# Patient Record
Sex: FEMALE | Race: WHITE | Employment: UNEMPLOYED | URBAN - METROPOLITAN AREA
[De-identification: names, ages, dates, MRNs, and addresses within clinical notes are randomized per-mention and may not be internally consistent; named-entity substitution may affect disease eponyms.]

---

## 2020-10-21 ENCOUNTER — ROUTINE PRENATAL (OUTPATIENT)
Dept: OBGYN CLINIC | Age: 25
End: 2020-10-21
Payer: COMMERCIAL

## 2020-10-21 VITALS
SYSTOLIC BLOOD PRESSURE: 116 MMHG | WEIGHT: 245 LBS | BODY MASS INDEX: 38.45 KG/M2 | HEART RATE: 76 BPM | HEIGHT: 67 IN | TEMPERATURE: 98.1 F | DIASTOLIC BLOOD PRESSURE: 78 MMHG

## 2020-10-21 LAB
GLUCOSE URINE, POC: NEGATIVE
PROTEIN UA: NEGATIVE

## 2020-10-21 PROCEDURE — G8484 FLU IMMUNIZE NO ADMIN: HCPCS | Performed by: OBSTETRICS & GYNECOLOGY

## 2020-10-21 PROCEDURE — 76813 OB US NUCHAL MEAS 1 GEST: CPT | Performed by: OBSTETRICS & GYNECOLOGY

## 2020-10-21 PROCEDURE — 99242 OFF/OP CONSLTJ NEW/EST SF 20: CPT | Performed by: OBSTETRICS & GYNECOLOGY

## 2020-10-21 PROCEDURE — 81002 URINALYSIS NONAUTO W/O SCOPE: CPT | Performed by: OBSTETRICS & GYNECOLOGY

## 2020-10-21 PROCEDURE — G8427 DOCREV CUR MEDS BY ELIG CLIN: HCPCS | Performed by: OBSTETRICS & GYNECOLOGY

## 2020-10-21 PROCEDURE — G8419 CALC BMI OUT NRM PARAM NOF/U: HCPCS | Performed by: OBSTETRICS & GYNECOLOGY

## 2020-10-21 RX ORDER — PNV NO.95/FERROUS FUM/FOLIC AC 28MG-0.8MG
1 TABLET ORAL DAILY
COMMUNITY

## 2020-10-21 SDOH — HEALTH STABILITY: MENTAL HEALTH: HOW OFTEN DO YOU HAVE A DRINK CONTAINING ALCOHOL?: NEVER

## 2020-10-21 NOTE — PROGRESS NOTES
10/21/20    RE:  Keaton Funes   : 1995   AGE: 89 [place] y.o. REFERRING PHYSICIANs:                      MD Lawson Hurt MD    Dear DrsYuri Thank you for referring Keaton Funes a 89 [place] [place] y.o.   who is seen today in our office. REASON FOR CONSULTATION:  · Consult for advice and opinion of pregnant obese patient with history of marijuana abuse in first trimester. Mrs Keaton Funes gave the following history when I saw her today:    OB History    Para Term  AB Living   1 0 0 0 0 0   SAB TAB Ectopic Molar Multiple Live Births   0 0 0 0 0 0      # Outcome Date GA Lbr Ezequiel/2nd Weight Sex Delivery Anes PTL Lv   1 Current              PAST GYNECOLOGICAL  HISTORY:  Negative for abnormal pap smears requiring surgical treatment. Negative for sexually transmitted diseases. PAST MEDICAL HISTORY:  Negative for Hypertension, Diabetes, Thyroid disease , Asthma or Heart disease. PAST SURGICAL HISTORY:  Past Surgical History:   Procedure Laterality Date    LYMPH NODE BIOPSY      TONSILLECTOMY AND ADENOIDECTOMY     Negative for Appendectomy, Cholecystectomy or surgery on the cervix such as LEEP, [facility] or Cryotherapy. ALLERGIES:    No Known Allergies      Current Outpatient Medications:     Pyridoxine HCl (VITAMIN B6 PO), Take 1 tablet by mouth every 8 hours as needed, Disp: , Rfl:     Prenatal Vit-Fe Fumarate-FA (PRENATAL VITAMIN) 27-0.8 MG TABS, Take 1 tablet by mouth daily, Disp: , Rfl:     SOCIAL  HISTORY:   She denies cigarette smoking. She uses marijuana daily. She denied alcohol and other illicit drug abuse.     REVIEW OF SYSTEMS:    CONSTITUTIONAL : No fever, no chills   HEENT :  No headache, no visual changes, no rhinorrhea, no sore throat   CARDIOVASCULAR :  No pain, no palpitations, no edema   RESPIRATORY :  No pain, no shortness of breath   GASTROINTESTINAL : No N/V, no D/C, no abdominal pain   GENITOURINARY :  No dysuria, hematuria and no incontinence   MUSCULOSKELETAL:  No myalgia, No back pain  NEUROLOGICAL :  No numbness, no tingling, no tremors. No history of seizures    FAMILY MEDICAL HISTORY:   Negative for birth defects, chromosomal anomalies and Mental retardation. OB Genetic Screening    Patient's Age 35+ at Date of Delivery No     Thalassemia MCV<80 No     Neural Tube Defect No     Congenital Heart Defect No     Down Syndrome No     Tony-Sachs No     Sickle Cell Disease or Trait No     Hemophilia No     Muscular Dystrophy No     Cystic Fibrosis No     [place] Chorea No     Mental Retardation/Autism No     Was Person Treated for Fragilex? No     Other Inherited Genetic Chromosomal Disorder? No     Maternal Metabolic Disorder No     Patient or [de-identified] Father Had Other Defects? No     Recurrent Pregnancy Loss or Still Birth? No        OB Infection History    High Risk Hepatitis B/Immunized? Yes     Live with Someone with or Exposed to TB? No     Patient or Partner has Hx of Genital Herpes? No     Rash or Viral Illness Since LMP? No     History of STD/GC/Chlamydia/HPV/Syphilis? No        Mrs Lance Hatchet had an uneventful course of pregnancy so far. When seen today in our office she had no complaints. PHYSICAL EXAMINATION:    General Appearance:  Healthy looking, alert, no acute distress. Eyes:     No pallor, no icterus, no photophobia. Ears:     No ear drainage. Nose:     No nasal drainage, no paranasal sinus tenderness. Throat:   Mucosa moist, no oral thrush, no exudate. Neck:     No nuchal rigidity. Back:     No CVA tenderness. Abdomen:    Soft nontender. Extremities:    No pretibial pitting edema, no calf muscle tenderness. Skin:     No rashes, no lesions. BP: 116/78 Weight: 245 lb (111.1 kg) Height: 5' 7\" (170.2 cm) Pulse: 76     Body mass index is 38.37 kg/m².   Urine dipstick:  Glucose : Negative   Albumin:  Negative       An ultrasound evaluation was done in our office today. Please refer to the enclosed copy of the ultrasound report for further information. IMPRESSION:  1. A 13w4d intrauterine pregnancy. 2. Obesity. 3. Marijuana abuse during pregnancy. 4. Anxiety, used BuSpar in the first trimester, stopped. RECOMMENDATIONS/PLAN:  I discussed with the patient the following points:    1. The benefits and limitations of the first trimester hormonal screening test along with a nuchal translucency measurement: This test can detect up to 90% of cases of fetal chromosomal anomalies. This means that 10% of the cases are going to be missed by this test.    2. The fact that the 1st trimester test does not screen for birth defects and neural tube defects. An anatomical survey of the baby by ultrasound will be necessary between 16 to 20 weeks to check for birth defects. 3. Screening for neural tube defects will involve maternal serum alpha-fetoprotein to be done in the 2nd trimester or a targeted ultrasound evaluation of the intracranial anatomy and spine. 4. The second trimester hormonal screening test (quad screen)  can detect up to 80% of fetal chromosomal anomalies. It carries however high false positive rate and requires confirmation with a genetic amniocentesis. 5. The fact that only a genetic amniocentesis can detect chromosomal anomalies. It carries, however, a risk of causing fetal loss. ( the risk of loss is quoted to be between 1:200 to 1:500). 6. She declined the genetic amniocentesis. I discussed the cell free DNA test ( NIPT)  with the patient. I advised her that this a screening test not a diagnostic test.The test screens only for trisomy 21, 18 and 13. She understands that the cell free DNA is  a screening test, it does not replace the diagnostic genetic amniocentesis. She agreed to have the cell free DNA test. It was ordered today.   7. Exposure to different medications such as Buspar in the first trimester of pregnancy increases the likelihood of having a baby with birth defects. No structural anomalies are seen today by ultrasound . Not all birth defects, however, can be seen by ultrasound, and some defects might show on ultrasounds done later in pregnancy. 8. The ill effects of cigarette smoking and substance abuse during pregnancy and its association with an increased risk of intrauterine growth restriction, placental abruption, and pregnancy loss. In addition to the increased risk of  morbidity and mortality there is an increased risk of sudden infant death syndrome in the households where people smoke. I recommend that she stops smoking, and abstains from illicit drug use. 9. Obesity is assosiated with an increased risk of developing gestational diabetes, and disturbance in the growth of her baby, such as Large for dates and small for Dates. He said that gestational diabetes was ruled out with a negative GTT that was done in your office. Result of the test is not available for review. The test should be repeated at 26-28 weeks of gestation. 10. She is to continue to follow with you in your office for ongoing obstetric care. 11. I recommend second trimester fetal anatomical survey at 18 to 20 weeks of gestation. Thank you again, doctor, for allowing us to be of service to your patient. If I can be of further assistance, please do not hesitate to call. Sincerely,        Suzanne Sommers M.D., [facility]    Current encounter billing:  [place] OFFICE CONSULTATION NEW/ESTAB PATIENT 40 MIN [[id number]]  US Fetal Nuchal Translucency [[id number] CPT(R)]    **This report has been created using voice recognition software.  It may contain minor errors     which are inherent in voice recognition technology**

## 2020-10-21 NOTE — LETTER
10/21/20    RE:  Franck Given   : 1995   AGE: 31 y.o. REFERRING PHYSICIANs:                      MD Rodrigo Richardson MD    Dear Leena Thank you for referring Franck Given a 31 y.o.   who is seen today in our office. REASON FOR CONSULTATION:  · Consult for advice and opinion of pregnant obese patient with history of marijuana abuse in first trimester. Mrs Franck Rockwell gave the following history when I saw her today:    OB History    Para Term  AB Living   1 0 0 0 0 0   SAB TAB Ectopic Molar Multiple Live Births   0 0 0 0 0 0      # Outcome Date GA Lbr Ezequiel/2nd Weight Sex Delivery Anes PTL Lv   1 Current              PAST GYNECOLOGICAL  HISTORY:  Negative for abnormal pap smears requiring surgical treatment. Negative for sexually transmitted diseases. PAST MEDICAL HISTORY:  Negative for Hypertension, Diabetes, Thyroid disease , Asthma or Heart disease. PAST SURGICAL HISTORY:  Past Surgical History:   Procedure Laterality Date    LYMPH NODE BIOPSY      TONSILLECTOMY AND ADENOIDECTOMY     Negative for Appendectomy, Cholecystectomy or surgery on the cervix such as LEEP, [facility] or Cryotherapy. ALLERGIES:    No Known Allergies      Current Outpatient Medications:     Pyridoxine HCl (VITAMIN B6 PO), Take 1 tablet by mouth every 8 hours as needed, Disp: , Rfl:     Prenatal Vit-Fe Fumarate-FA (PRENATAL VITAMIN) 27-0.8 MG TABS, Take 1 tablet by mouth daily, Disp: , Rfl:     SOCIAL  HISTORY:   She denies cigarette smoking. She uses marijuana daily. She denied alcohol and other illicit drug abuse.     REVIEW OF SYSTEMS:    CONSTITUTIONAL : No fever, no chills   HEENT :  No headache, no visual changes, no rhinorrhea, no sore throat   CARDIOVASCULAR :  No pain, no palpitations, no edema   RESPIRATORY :  No pain, no shortness of breath GASTROINTESTINAL : No N/V, no D/C, no abdominal pain   GENITOURINARY :  No dysuria, hematuria and no incontinence   MUSCULOSKELETAL:  No myalgia, No back pain  NEUROLOGICAL :  No numbness, no tingling, no tremors. No history of seizures    FAMILY MEDICAL HISTORY:   Negative for birth defects, chromosomal anomalies and Mental retardation. OB Genetic Screening    Patient's Age 35+ at Date of Delivery No     Thalassemia MCV<80 No     Neural Tube Defect No     Congenital Heart Defect No     Down Syndrome No     Tony-Sachs No     Sickle Cell Disease or Trait No     Hemophilia No     Muscular Dystrophy No     Cystic Fibrosis No     [place] Chorea No     Mental Retardation/Autism No     Was Person Treated for Fragilex? No     Other Inherited Genetic Chromosomal Disorder? No     Maternal Metabolic Disorder No     Patient or [de-identified] Father Had Other Defects? No     Recurrent Pregnancy Loss or Still Birth? No        OB Infection History    High Risk Hepatitis B/Immunized? Yes     Live with Someone with or Exposed to TB? No     Patient or Partner has Hx of Genital Herpes? No     Rash or Viral Illness Since LMP? No     History of STD/GC/Chlamydia/HPV/Syphilis? No        Mrs Marifer Cruz had an uneventful course of pregnancy so far. When seen today in our office she had no complaints. PHYSICAL EXAMINATION:    General Appearance:  Healthy looking, alert, no acute distress. Eyes:     No pallor, no icterus, no photophobia. Ears:     No ear drainage. Nose:     No nasal drainage, no paranasal sinus tenderness. Throat:   Mucosa moist, no oral thrush, no exudate. Neck:     No nuchal rigidity. Back:     No CVA tenderness. Abdomen:    Soft nontender. Extremities:    No pretibial pitting edema, no calf muscle tenderness. Skin:     No rashes, no lesions. BP: 116/78 Weight: 245 lb (111.1 kg) Height: 5' 7\" (170.2 cm) Pulse: 76     Body mass index is 38.37 kg/m².   Urine dipstick: Glucose : Negative   Albumin:  Negative       An ultrasound evaluation was done in our office today. Please refer to the enclosed copy of the ultrasound report for further information. IMPRESSION:  1. A 13w4d intrauterine pregnancy. 2. Obesity. 3. Marijuana abuse during pregnancy. 4. Anxiety, used BuSpar in the first trimester, stopped. RECOMMENDATIONS/PLAN:  I discussed with the patient the following points:    1. The benefits and limitations of the first trimester hormonal screening test along with a nuchal translucency measurement: This test can detect up to 90% of cases of fetal chromosomal anomalies. This means that 10% of the cases are going to be missed by this test.    2. The fact that the 1st trimester test does not screen for birth defects and neural tube defects. An anatomical survey of the baby by ultrasound will be necessary between 16 to 20 weeks to check for birth defects. 3. Screening for neural tube defects will involve maternal serum alpha-fetoprotein to be done in the 2nd trimester or a targeted ultrasound evaluation of the intracranial anatomy and spine. 4. The second trimester hormonal screening test (quad screen)  can detect up to 80% of fetal chromosomal anomalies. It carries however high false positive rate and requires confirmation with a genetic amniocentesis. 5. The fact that only a genetic amniocentesis can detect chromosomal anomalies. It carries, however, a risk of causing fetal loss. ( the risk of loss is quoted to be between 1:200 to 1:500). 6. She declined the genetic amniocentesis. I discussed the cell free DNA test ( NIPT)  with the patient. I advised her that this a screening test not a diagnostic test.The test screens only for trisomy 21, 18 and 13. She understands that the cell free DNA is  a screening test, it does not replace the diagnostic genetic amniocentesis. She agreed to have the cell free DNA test. It was ordered today. 7. Exposure to different medications such as Buspar in the first trimester of pregnancy increases the likelihood of having a baby with birth defects. No structural anomalies are seen today by ultrasound . Not all birth defects, however, can be seen by ultrasound, and some defects might show on ultrasounds done later in pregnancy. 8. The ill effects of cigarette smoking and substance abuse during pregnancy and its association with an increased risk of intrauterine growth restriction, placental abruption, and pregnancy loss. In addition to the increased risk of  morbidity and mortality there is an increased risk of sudden infant death syndrome in the households where people smoke. I recommend that she stops smoking, and abstains from illicit drug use. 9. Obesity is assosiated with an increased risk of developing gestational diabetes, and disturbance in the growth of her baby, such as Large for dates and small for Dates. He said that gestational diabetes was ruled out with a negative GTT that was done in your office. Result of the test is not available for review. The test should be repeated at 26-28 weeks of gestation. 10. She is to continue to follow with you in your office for ongoing obstetric care. 11. I recommend second trimester fetal anatomical survey at 18 to 20 weeks of gestation. Thank you again, doctor, for allowing us to be of service to your patient. If I can be of further assistance, please do not hesitate to call. Sincerely,        Jenny West M.D., [address] [facility]    Current encounter billing:  [place] OFFICE CONSULTATION NEW/ESTAB PATIENT 40 MIN [[id number]]  US Fetal Nuchal Translucency [[id number] CPT(R)]    **This report has been created using voice recognition software.  It may contain minor errors     which are inherent in voice recognition technology**

## 2020-11-02 ENCOUNTER — TELEPHONE (OUTPATIENT)
Dept: OBGYN CLINIC | Age: 25
End: 2020-11-02

## 2020-12-02 ENCOUNTER — ROUTINE PRENATAL (OUTPATIENT)
Dept: OBGYN CLINIC | Age: 25
End: 2020-12-02
Payer: COMMERCIAL

## 2020-12-02 VITALS
TEMPERATURE: 97.5 F | DIASTOLIC BLOOD PRESSURE: 75 MMHG | WEIGHT: 244 LBS | BODY MASS INDEX: 38.3 KG/M2 | HEIGHT: 67 IN | SYSTOLIC BLOOD PRESSURE: 115 MMHG | HEART RATE: 111 BPM

## 2020-12-02 PROBLEM — Z3A.19 19 WEEKS GESTATION OF PREGNANCY: Status: ACTIVE | Noted: 2020-12-02

## 2020-12-02 LAB
GLUCOSE URINE, POC: NEGATIVE
PROTEIN UA: NEGATIVE

## 2020-12-02 PROCEDURE — G8419 CALC BMI OUT NRM PARAM NOF/U: HCPCS | Performed by: OBSTETRICS & GYNECOLOGY

## 2020-12-02 PROCEDURE — 99213 OFFICE O/P EST LOW 20 MIN: CPT | Performed by: OBSTETRICS & GYNECOLOGY

## 2020-12-02 PROCEDURE — 81002 URINALYSIS NONAUTO W/O SCOPE: CPT | Performed by: OBSTETRICS & GYNECOLOGY

## 2020-12-02 PROCEDURE — 1036F TOBACCO NON-USER: CPT | Performed by: OBSTETRICS & GYNECOLOGY

## 2020-12-02 PROCEDURE — 76811 OB US DETAILED SNGL FETUS: CPT | Performed by: OBSTETRICS & GYNECOLOGY

## 2020-12-02 PROCEDURE — G8427 DOCREV CUR MEDS BY ELIG CLIN: HCPCS | Performed by: OBSTETRICS & GYNECOLOGY

## 2020-12-02 PROCEDURE — 76817 TRANSVAGINAL US OBSTETRIC: CPT | Performed by: OBSTETRICS & GYNECOLOGY

## 2020-12-02 PROCEDURE — G8484 FLU IMMUNIZE NO ADMIN: HCPCS | Performed by: OBSTETRICS & GYNECOLOGY

## 2020-12-02 NOTE — PATIENT INSTRUCTIONS
call your doctor if you are having problems. It's also a good idea to know your test results and keep a list of the medicines you take. How can you care for yourself at home? Ease sleep problems  · Avoid caffeine in drinks or chocolate late in the day. · Get some exercise every day. · Take a warm shower or bath before bed. · Have a light snack or glass of milk at bedtime. · Do relaxation exercises in bed to calm your mind and body. · Support your legs and back with extra pillows. Try a pillow between your legs if you sleep on your side. · Do not use sleeping pills or alcohol. They could harm your baby. Ease leg cramps  · Do not massage your calf during the cramp. · Sit on a firm bed or chair. Straighten your leg, and bend your foot (flex your ankle) slowly upward, toward your knee. Bend your toes up and down. · Stand on a cool, flat surface. Stretch your toes upward, and take small steps walking on your heels. · Use a heating pad or hot water bottle to help with muscle ache. Prevent leg cramps  · Be sure to get enough calcium. If you are worried that you are not getting enough, talk to your doctor. · Exercise every day, and stretch your legs before bed. · Take a warm bath before bed, and try leg warmers at night. Where can you learn more? Go to https://[employer]Me. org and sign in to your [employer] account. Enter L531 in the [facility] box to learn more about \"Weeks 18 to 22 of Your Pregnancy: Care Instructions. \"     If you do not have an account, please click on the \"Sign Up Now\" link. Current as of: February 11, 2020               Content Version: 12.6  © 5767-2141 [employer]. Care instructions adapted under license by [facility] ([facility]). If you have questions about a medical condition or this instruction, always ask your healthcare professional. [address] any warranty or liability for your use of this information.          Patient

## 2020-12-02 NOTE — PROGRESS NOTES
20     RE:  Marifer Cruz   : 1995   AGE: 31 y.o. REFERRING PHYSICIANs:                      MD Dante Chapman MD Patience    Dear Leena Mrs. Marifer Cruz a 31 y.o.    is seen today on follow up in our office. REASON FOR APPOINTMENT:  · Follow-up on a pregnant obese patient with history of marijuana abuse in first trimester. MEDICATIONS:    Prenatal Vitamins once per day   Vitamin B6 1 tablet every 8 hours as needed. INTERVAL HISTORY:  Mrs Marifer Cruz had an uneventful course of pregnancy since her last visit to our office. When seen today in our office she had no complaints. PHYSICAL EXAMINATION:  General Appearance:  Healthy looking, alert, no acute distress. Eyes:     No pallor, no icterus, no photophobia. Ears:     No ear drainage. Nose:     No nasal drainage, no paranasal sinus tenderness. Throat:   Mucosa moist, no oral thrush, no exudate. Neck:     No nuchal rigidity. Back:     No CVA tenderness. Abdomen:    Soft nontender. Extremities:    No pretibial pitting edema, no calf muscle tenderness. Skin:     No rashes, no lesions. BP: 115/75 Weight: 244 lb (110.7 kg) Height: 5' 7\" (170.2 cm) Pulse: 111     Body mass index is 38.22 kg/m². Urine dipstick:  Glucose : Negative   Albumin:  Negative       An ultrasound evaluation was done in our office today. Please refer to the enclosed copy of the ultrasound report for further information. Chart and Lab Work Review:    I reviewed with the patient the result of the:  · Cell free DNA test collected on 10/21/2020 that showed low probability of having baby with trisomy 24, 25 or 13. IMPRESSION:  1. A  19w4d  intrauterine gestation. 2. Obesity. 3. Marijuana abuse during pregnancy. 4. Anxiety, used BuSpar in the first trimester, stopped. RECOMMENDATIONS/PLAN:  I discussed with the patient the following points:      1.  The benefits and limitations of ultrasound in prenatal diagnosis. Some defects might not always be seen by ultrasound. Estimated incidence of these defects in the general population is 2- 4%. 2. No structural  anomalies are noted. Only genetic amniocentesis can rule out fetal chromosome anomalies. Normal ultrasound does not. 3. The size of her baby is appropriate for gestational age. 4. Based on her age and the absence of chromosomal markers by ultrasound today, the risk of chromosomal anomalies is small and does not indicate a need for an amniocentesis, a procedure that might cause pregnancy loss ( the risk of loss is quoted to be between 1:200 to 1:500). 5. An amniocentesis is indicated if fetal structural defects are seen or if the first Trimester,  second trimester hormonal screening test (quad screen) , or if the cell free DNA test NIPT: non-invasive prenatal test) showed an increase risk for Chromosomal anomalies. She was reassured by the result of the cell free DNA test. I explained to her that this a screening test not a diagnostic test. It does not replace the diagnostic genetic amniocentesis. It screens only for trisomy 21, 18 and 13.  6. Exposure to different medications such as Buspar in the first trimester of pregnancy increases the likelihood of having a baby with birth defects. No structural anomalies are seen today by ultrasound . Not all birth defects, however, can be seen by ultrasound, and some defects might show on ultrasounds done later in pregnancy. 7. The ill effects of cigarette smoking and substance abuse during pregnancy and its association with an increased risk of intrauterine growth restriction, placental abruption, and pregnancy loss. In addition to the increased risk of  morbidity and mortality there is an increased risk of sudden infant death syndrome in the households where people smoke.  I recommend that she stops smoking, and abstains from illicit drug use.  8. Obesity is assosiated with an increased risk of developing gestational diabetes, and disturbance in the growth of her baby, such as Large for dates and small for Dates. He said that gestational diabetes was ruled out with a negative GTT that was done in your office. Result of the test is not available for review. The test should be repeated at 26-28 weeks of gestation. 9. The cervical length measurement today is reassuring. It is within normal limits. No funneling or shortening were noted. 10. She is to continue to follow with you in your office for ongoing obstetric care. 11. I recommend a follow up ultrasound evaluation in 10 weeks, to check on fetal wellbeing anatomy and growth. Thank you again, doctor, for allowing us to be of service to your patient. If I can be of further assistance, please do not hesitate to call. Sincerely,        Abhi Saleh M.D., [address] [facility]    Current encounter billing:  [place] OFFICE OUTPATIENT VISIT 15 MINUTES [[id number]]  US OB Detail Fetal Anatomy Single or 1st [[id number] Custom]  US OB Transvaginal [[id number] Custom]    **This report has been created using voice recognition software.  It may contain minor errors     which are inherent in voice recognition technology**

## 2020-12-02 NOTE — LETTER
20     RE:  Sree Blake   : 1995   AGE: 31 y.o. REFERRING PHYSICIANs:                      MD Marc Kapoor MD Labor    Dear Shwetha. Mrs. Sree Blake a 31 y.o.    is seen today on follow up in our office. REASON FOR APPOINTMENT:  · Follow-up on a pregnant obese patient with history of marijuana abuse in first trimester. MEDICATIONS:    Prenatal Vitamins once per day   Vitamin B6 1 tablet every 8 hours as needed. INTERVAL HISTORY:  Mrs Sree Blake had an uneventful course of pregnancy since her last visit to our office. When seen today in our office she had no complaints. PHYSICAL EXAMINATION:  General Appearance:  Healthy looking, alert, no acute distress. Eyes:     No pallor, no icterus, no photophobia. Ears:     No ear drainage. Nose:     No nasal drainage, no paranasal sinus tenderness. Throat:   Mucosa moist, no oral thrush, no exudate. Neck:     No nuchal rigidity. Back:     No CVA tenderness. Abdomen:    Soft nontender. Extremities:    No pretibial pitting edema, no calf muscle tenderness. Skin:     No rashes, no lesions. BP: 115/75 Weight: 244 lb (110.7 kg) Height: 5' 7\" (170.2 cm) Pulse: 111     Body mass index is 38.22 kg/m². Urine dipstick:  Glucose : Negative   Albumin:  Negative       An ultrasound evaluation was done in our office today. Please refer to the enclosed copy of the ultrasound report for further information. Chart and Lab Work Review:    I reviewed with the patient the result of the:  · Cell free DNA test collected on 10/21/2020 that showed low probability of having baby with trisomy 24, 25 or 13. IMPRESSION:  1. A  19w4d  intrauterine gestation. 2. Obesity. 3. Marijuana abuse during pregnancy. 4. Anxiety, used BuSpar in the first trimester, stopped.     RECOMMENDATIONS/PLAN:  I discussed with the patient the following points: 1. The benefits and limitations of ultrasound in prenatal diagnosis. Some defects might not always be seen by ultrasound. Estimated incidence of these defects in the general population is 2- 4%. 2. No structural  anomalies are noted. Only genetic amniocentesis can rule out fetal chromosome anomalies. Normal ultrasound does not. 3. The size of her baby is appropriate for gestational age. 4. Based on her age and the absence of chromosomal markers by ultrasound today, the risk of chromosomal anomalies is small and does not indicate a need for an amniocentesis, a procedure that might cause pregnancy loss ( the risk of loss is quoted to be between 1:200 to 1:500). 5. An amniocentesis is indicated if fetal structural defects are seen or if the first Trimester,  second trimester hormonal screening test (quad screen) , or if the cell free DNA test NIPT: non-invasive prenatal test) showed an increase risk for Chromosomal anomalies. She was reassured by the result of the cell free DNA test. I explained to her that this a screening test not a diagnostic test. It does not replace the diagnostic genetic amniocentesis. It screens only for trisomy 21, 18 and 13.  6. Exposure to different medications such as Buspar in the first trimester of pregnancy increases the likelihood of having a baby with birth defects. No structural anomalies are seen today by ultrasound . Not all birth defects, however, can be seen by ultrasound, and some defects might show on ultrasounds done later in pregnancy. 7. The ill effects of cigarette smoking and substance abuse during pregnancy and its association with an increased risk of intrauterine growth restriction, placental abruption, and pregnancy loss. In addition to the increased risk of  morbidity and mortality there is an increased risk of sudden infant death syndrome in the households where people smoke. I recommend that she stops smoking, and abstains from illicit drug use. 8. Obesity is assosiated with an increased risk of developing gestational diabetes, and disturbance in the growth of her baby, such as Large for dates and small for Dates. He said that gestational diabetes was ruled out with a negative GTT that was done in your office. Result of the test is not available for review. The test should be repeated at 26-28 weeks of gestation. 9. The cervical length measurement today is reassuring. It is within normal limits. No funneling or shortening were noted. 10. She is to continue to follow with you in your office for ongoing obstetric care. 11. I recommend a follow up ultrasound evaluation in 10 weeks, to check on fetal wellbeing anatomy and growth. Thank you again, doctor, for allowing us to be of service to your patient. If I can be of further assistance, please do not hesitate to call. Sincerely,        Jeanine Amor M.D., [address] [facility]    Current encounter billing:  [place] OFFICE OUTPATIENT VISIT 15 MINUTES [[id number]]  US OB Detail Fetal Anatomy Single or 1st [[id number] Custom]  US OB Transvaginal [[id number] Custom]    **This report has been created using voice recognition software.  It may contain minor errors     which are inherent in voice recognition technology**

## 2021-02-10 ENCOUNTER — ANCILLARY PROCEDURE (OUTPATIENT)
Dept: OBGYN CLINIC | Age: 26
End: 2021-02-10
Payer: COMMERCIAL

## 2021-02-10 ENCOUNTER — ROUTINE PRENATAL (OUTPATIENT)
Dept: OBGYN CLINIC | Age: 26
End: 2021-02-10
Payer: COMMERCIAL

## 2021-02-10 VITALS
DIASTOLIC BLOOD PRESSURE: 75 MMHG | HEIGHT: 67 IN | WEIGHT: 255 LBS | HEART RATE: 98 BPM | BODY MASS INDEX: 40.02 KG/M2 | SYSTOLIC BLOOD PRESSURE: 112 MMHG | TEMPERATURE: 98 F

## 2021-02-10 DIAGNOSIS — O99.333 TOBACCO SMOKING COMPLICATING PREGNANCY, THIRD TRIMESTER: ICD-10-CM

## 2021-02-10 DIAGNOSIS — O99.213 OBESITY AFFECTING PREGNANCY IN THIRD TRIMESTER: Primary | ICD-10-CM

## 2021-02-10 DIAGNOSIS — O99.323 DRUG DEPENDENCE AFFECTING PREGNANCY IN THIRD TRIMESTER: ICD-10-CM

## 2021-02-10 DIAGNOSIS — F12.20 CANNABIS DEPENDENCE (HCC): ICD-10-CM

## 2021-02-10 DIAGNOSIS — Z3A.29 29 WEEKS GESTATION OF PREGNANCY: ICD-10-CM

## 2021-02-10 LAB
GLUCOSE URINE, POC: NEGATIVE
PROTEIN UA: NEGATIVE

## 2021-02-10 PROCEDURE — 1036F TOBACCO NON-USER: CPT | Performed by: OBSTETRICS & GYNECOLOGY

## 2021-02-10 PROCEDURE — 99212 OFFICE O/P EST SF 10 MIN: CPT | Performed by: OBSTETRICS & GYNECOLOGY

## 2021-02-10 PROCEDURE — 76819 FETAL BIOPHYS PROFIL W/O NST: CPT | Performed by: OBSTETRICS & GYNECOLOGY

## 2021-02-10 PROCEDURE — G8419 CALC BMI OUT NRM PARAM NOF/U: HCPCS | Performed by: OBSTETRICS & GYNECOLOGY

## 2021-02-10 PROCEDURE — G8427 DOCREV CUR MEDS BY ELIG CLIN: HCPCS | Performed by: OBSTETRICS & GYNECOLOGY

## 2021-02-10 PROCEDURE — 99213 OFFICE O/P EST LOW 20 MIN: CPT | Performed by: OBSTETRICS & GYNECOLOGY

## 2021-02-10 PROCEDURE — 81002 URINALYSIS NONAUTO W/O SCOPE: CPT | Performed by: OBSTETRICS & GYNECOLOGY

## 2021-02-10 PROCEDURE — G8484 FLU IMMUNIZE NO ADMIN: HCPCS | Performed by: OBSTETRICS & GYNECOLOGY

## 2021-02-10 PROCEDURE — 76805 OB US >/= 14 WKS SNGL FETUS: CPT | Performed by: OBSTETRICS & GYNECOLOGY

## 2021-02-10 NOTE — LETTER
2/10/21     RE:  Irma Davidson   : 1995   AGE: 31 y.o. REFERRING PHYSICIAN:                                   REFERRING PHYSICIANs:                      MD Nena Urbano M.D., MD  Dear Drs. Mrs. Irma Davidson a 31 y.o.    is seen today on follow up in our office. REASON FOR APPOINTMENT:  · Follow-up on a pregnant obese patient with history of marijuana abuse. MEDICATIONS:    Prenatal Vitamins once per day     INTERVAL HISTORY:  Mrs Irma Davidson had an uneventful course of pregnancy since her last visit to our office. When seen today in our office she had no complaints. INTERVAL HISTORY:  Mrs Irma Davidson had an uneventful course of pregnancy since her last visit to our office. She said that she is transferring her prenatal care to office of . Last used marijuana was mid 2021. When seen today in our office she had no complaints. PHYSICAL EXAMINATION:  General Appearance:  Healthy looking, alert, no acute distress. Eyes:     No pallor, no icterus, no photophobia. Ears:     No ear drainage. Nose:     No nasal drainage, no paranasal sinus tenderness. Throat:   Mucosa moist, no oral thrush, no exudate. Neck:     No nuchal rigidity. Back:     No CVA tenderness. Abdomen:    Soft nontender. Extremities:    No pretibial pitting edema, no calf muscle tenderness. Skin:     No rashes, no lesions. BP: 112/75 Weight: 255 lb (115.7 kg) Height: 5' 7\" (170.2 cm) Pulse: 98     Body mass index is 39.94 kg/m². Urine dipstick:  Glucose : Negative   Albumin:  Negative       An ultrasound evaluation was done in our office today. Please refer to the enclosed copy of the ultrasound report for further information. IMPRESSION:  1. A  29w4d  intrauterine gestation. 2. Obesity. 3. Marijuana abuse during pregnancy. 4. Anxiety, used BuSpar in the first trimester, stopped. 5. Recent transfer of prenatal care to office of Dr. Marisela Murrell. RECOMMENDATIONS/PLAN:  I discussed with the patient the following points:    1. The benefits and limitations of ultrasound in prenatal diagnosis. Some defects might not always be seen by ultrasound. Estimated incidence of these defects in the general population is 2- 4%. 2. No structural  anomalies are noted. Only genetic amniocentesis can rule out fetal chromosome anomalies. Normal ultrasound does not. 3. The size of her baby is appropriate for gestational age. 4. Exposure to different medications such as Buspar in the first trimester of pregnancy increases the likelihood of having a baby with birth defects. No structural anomalies are seen today by ultrasound . Not all birth defects, however, can be seen by ultrasound, and some defects might show on ultrasounds done later in pregnancy. 5. The ill effects of cigarette smoking and substance abuse during pregnancy and its association with an increased risk of intrauterine growth restriction, placental abruption, and pregnancy loss. In addition to the increased risk of  morbidity and mortality there is an increased risk of sudden infant death syndrome in the households where people smoke. I recommend that she stops smoking, and abstains from illicit drug use. 6. Obesity is assosiated with an increased risk of developing gestational diabetes, and disturbance in the growth of her baby, such as Large for dates and small for Dates. She said that she is scheduled to have an appointment at office of [address] [place] [address] on 2021. She was encouraged to keep the appointment. 7. Fetal well-being was confirmed today.   The amount of fluid around baby is normal.  The Biophysical profile score of 8/8 is reassuring, and the umbilical artery Doppler studies are normal. 8. She should monitor fetal well-being at home by counting movements after dinner. Her baby should  move 10 times in 2 hours; otherwise, she should call your office immediately. She is also to call, if she develops any headaches, blurred vision, abdominal pain, bleeding, or spotting, which are signs of preeclampsia. 9. She is to continue to follow with you in your office for ongoing obstetric care. 10. I recommend a follow up ultrasound evaluation in 4 weeks, to check on fetal wellbeing anatomy and growth. Thank you again, doctor, for allowing us to be of service to your patient. If I can be of further assistance, please do not hesitate to call. Sincerely,        Glenice Cushing, M.D., [address] [facility]    Time spent on the encounter was over 15 minutes including counseling  coordination of care and direct face-to-face contact with the patient. Current encounter billing:  [place] OFFICE OUTPATIENT VISIT LOW MDM 20-30 MINUTES [[id number]]  US OB 14 Plus Weeks Single or First Gestation [[id number] Custom]  US Fetal Biophysical Profile WO Non Stress Testing [31230 Custom]    **This report has been created using voice recognition software.  It may contain minor errors     which are inherent in voice recognition technology**

## 2021-02-10 NOTE — PATIENT INSTRUCTIONS
Please arrive for your scheduled appointment at least 15 minutes early with your actual insurance card+ a photo ID. Also if you need any refills ordered or have questions, it may take up 48 hours to reply. Please allow ample time for your refills. Call me when you use last refill. Thank you for your cooperation. Any questions contact [facility] at [phone]. If you are experiencing an emergency and need immediate help, call 911 or go to go emergency room or labor and delivery. if you are sick, not feeling well or have an infectious process going on please reschedule your appointment by calling [phone]. Also if any family members are not feeling well, please do not bring them to your appointment. We appreciate your cooperation. We are doing this in order to protect our pregnant mothers+ their babies. Do kick counts after dinner. Call your primary obstetrician if less than 10 kicks in 2 hours after dinner. Call your primary obstetrician with bleeding, leaking of fluid, abdominal tenderness, headache, blurry vision, epigastric pain and increased urinary frequency. Patient Education        Weeks 26 to 30 of Your Pregnancy: Care Instructions  Your Care Instructions     You are now in your last trimester of pregnancy. Your baby is growing rapidly. And you'll probably feel your baby moving around more often. Your doctor may ask you to count your baby's kicks. Your back may ache as your body gets used to your baby's size and length. If you haven't already had the Tdap shot during this pregnancy, talk to your doctor about getting it. It will help protect your  against pertussis infection. During this time, it's important to take care of yourself and pay attention to what your body needs. If you feel sexual, explore ways to be close with your partner that match your comfort and desire. Use the tips provided in this care sheet to find ways to be sexual in your own way.   Follow-up care is a key part of your treatment and safety. Be sure to make and go to all appointments, and call your doctor if you are having problems. It's also a good idea to know your test results and keep a list of the medicines you take. How can you care for yourself at home? Take it easy at work  · Take frequent breaks. If possible, stop working when you are tired, and rest during your lunch hour. · Take bathroom breaks every 2 hours. · Change positions often. If you sit for long periods, stand up and walk around. · When you stand for a long time, keep one foot on a low stool with your knee bent. After standing a lot, sit with your feet up. · Avoid fumes, chemicals, and tobacco smoke. Be sexual in your own way  · Having sex during pregnancy is okay, unless your doctor tells you not to. · You may be very interested in sex, or you may have no interest at all. · Your growing belly can make it hard to find a good position during intercourse. [place] and explore. · You may get cramps in your uterus when your partner touches your breasts. · A back rub may relieve the backache or cramps that sometimes follow orgasm. Learn about  labor  · Watch for signs of  labor. You may be going into labor if:  ? You have menstrual-like cramps, with or without nausea. ? You have about 6 or more contractions in 1 hour, even after you have had a glass of water and are resting. ? You have a low, dull backache that does not go away when you change your position. ? You have pain or pressure in your pelvis that comes and goes in a pattern. ? You have intestinal cramping or flu-like symptoms, with or without diarrhea.  ? You notice an increase or change in your vaginal discharge. Discharge may be heavy, mucus-like, watery, or streaked with blood. ? Your water breaks. · If you think you have  labor:  ? Drink 2 or 3 glasses of water or juice. Not drinking enough fluids can cause contractions.   ? Stop what you are doing, and empty your bladder. Then lie down on your left side for at least 1 hour. ? While lying on your side, find your breast bone. Put your fingers in the soft spot just below it. Move your fingers down toward your belly button to find the top of your uterus. Check to see if it is tight. ? Contractions can be weak or strong. Record your contractions for an hour. Time a contraction from the start of one contraction to the start of the next one.  ? Single or several strong contractions without a pattern are called Jose Angel-Smith contractions. They are practice contractions but not the start of labor. They often stop if you change what you are doing. ? Call your doctor if you have regular contractions. Where can you learn more? Go to https://Media LanternpeHavgul [employer] Energyeb.[employer]. org and sign in to your Siasto account. Enter J844 in the uTrail me box to learn more about \"Weeks 26 to 30 of Your Pregnancy: Care Instructions. \"     If you do not have an account, please click on the \"Sign Up Now\" link. Current as of: February 11, 2020               Content Version: 12.6  © 3503-0953 [employer]. Care instructions adapted under license by [facility] ([facility]). If you have questions about a medical condition or this instruction, always ask your healthcare professional. [place] [address] any warranty or liability for your use of this information. Patient Education        Learning About When to Call Your Doctor During Pregnancy (After 20 Weeks)  Your Care Instructions  It's common to have concerns about what might be a problem during pregnancy. Although most pregnant women don't have any serious problems, it's important to know when to call your doctor if you have certain symptoms or signs of labor. These are general suggestions. Your doctor may give you some more information about when to call. When to call your doctor (after 20 weeks)  Call 911 anytime you think you may need emergency care.  For example, call if:  · You have severe vaginal bleeding. · You have sudden, severe pain in your belly. · You passed out (lost consciousness). · You have a seizure. · You see or feel the umbilical cord. · You think you are about to deliver your baby and can't make it safely to the hospital.  Call your doctor now or seek immediate medical care if:  · You have vaginal bleeding. · You have belly pain. · You have a fever. · You have symptoms of preeclampsia, such as:  ? Sudden swelling of your face, hands, or feet. ? New vision problems (such as dimness, blurring, or seeing spots). ? A severe headache. · You have a sudden release of fluid from your vagina. (You think your water broke.)  · You think that you may be in labor. This means that you've had at least 6 contractions in an hour. · You notice that your baby has stopped moving or is moving much less than normal.  · You have symptoms of a urinary tract infection. These may include:  ? Pain or burning when you urinate. ? A frequent need to urinate without being able to pass much urine. ? Pain in the flank, which is just below the rib cage and above the waist on either side of the back. ? Blood in your urine. Watch closely for changes in your health, and be sure to contact your doctor if:  · You have vaginal discharge that smells bad. · You have skin changes, such as:  ? A rash. ? Itching. ? Yellow color to your skin. · You have other concerns about your pregnancy. If you have labor signs at 37 weeks or more  If you have signs of labor at 37 weeks or more, your doctor may tell you to call when your labor becomes more active. Symptoms of active labor include:  · Contractions that are regular. · Contractions that are less than 5 minutes apart. · Contractions that are hard to talk through. Follow-up care is a key part of your treatment and safety. Be sure to make and go to all appointments, and call your doctor if you are having problems.  It's also a good idea to know your test results and keep a list of the medicines you take. Where can you learn more? Go to https://chpepiceweb.[employer] org and sign in to your Lala account. Enter  in the [facility] box to learn more about \"Learning About When to Call Your Doctor During Pregnancy (After 20 Weeks). \"     If you do not have an account, please click on the \"Sign Up Now\" link. Current as of: February 11, 2020               Content Version: 12.6  © 6448-5098 [employer]. Care instructions adapted under license by [facility] ([facility]). If you have questions about a medical condition or this instruction, always ask your healthcare professional. [address] any warranty or liability for your use of this information. Patient Education        Counting Your Baby's Kicks: Care Instructions  Your Care Instructions     Counting your baby's kicks is one way your doctor can tell that your baby is healthy. Most women--especially in a first pregnancy--feel their baby move for the first time between 16 and 22 weeks. The movement may feel like flutters rather than kicks. Your baby may move more at certain times of the day. When you are active, you may notice less kicking than when you are resting. At your prenatal visits, your doctor will ask whether the baby is active. In your last trimester, your doctor may ask you to count the number of times you feel your baby move. Follow-up care is a key part of your treatment and safety. Be sure to make and go to all appointments, and call your doctor if you are having problems. It's also a good idea to know your test results and keep a list of the medicines you take. How do you count fetal kicks? · A common method of checking your baby's movement is to count the number of kicks or moves you feel in 1 hour.  Ten movements (such as kicks, flutters, or rolls) in 1 hour are normal. Some doctors suggest that you count in the morning until you get to 10 movements. Then you can quit for that day and start again the next day. · Pick your baby's most active time of day to count. This may be any time from morning to evening. · If you do not feel 10 movements in an hour, your baby may be sleeping. Wait for the next hour and count again. When should you call for help? Call your doctor now or seek immediate medical care if:    · You noticed that your baby has stopped moving or is moving much less than normal.   Watch closely for changes in your health, and be sure to contact your doctor if you have any problems. Where can you learn more? Go to https://[employer]. org and sign in to your Capevo account. Enter B678 in the YoQueVos box to learn more about \"Counting Your Baby's Kicks: Care Instructions. \"     If you do not have an account, please click on the \"Sign Up Now\" link. Current as of: February 11, 2020               Content Version: 12.6  © 2006-2020 [employer]. Care instructions adapted under license by [facility] ([facility]). If you have questions about a medical condition or this instruction, always ask your healthcare professional. [address] any warranty or liability for your use of this information.

## 2021-02-10 NOTE — PROGRESS NOTES
BuSpar in the first trimester, stopped. 5. Recent transfer of prenatal care to office of Dr. Raj Mcmillan. RECOMMENDATIONS/PLAN:  I discussed with the patient the following points:    1. The benefits and limitations of ultrasound in prenatal diagnosis. Some defects might not always be seen by ultrasound. Estimated incidence of these defects in the general population is 2- 4%. 2. No structural  anomalies are noted. Only genetic amniocentesis can rule out fetal chromosome anomalies. Normal ultrasound does not. 3. The size of her baby is appropriate for gestational age. 4. Exposure to different medications such as Buspar in the first trimester of pregnancy increases the likelihood of having a baby with birth defects. No structural anomalies are seen today by ultrasound . Not all birth defects, however, can be seen by ultrasound, and some defects might show on ultrasounds done later in pregnancy. 5. The ill effects of cigarette smoking and substance abuse during pregnancy and its association with an increased risk of intrauterine growth restriction, placental abruption, and pregnancy loss. In addition to the increased risk of  morbidity and mortality there is an increased risk of sudden infant death syndrome in the households where people smoke. I recommend that she stops smoking, and abstains from illicit drug use. 6. Obesity is assosiated with an increased risk of developing gestational diabetes, and disturbance in the growth of her baby, such as Large for dates and small for Dates. She said that she is scheduled to have an appointment at office of [address] [place] [address] on 2021. She was encouraged to keep the appointment. 7. Fetal well-being was confirmed today. The amount of fluid around baby is normal.  The Biophysical profile score of 8/8 is reassuring, and the umbilical artery Doppler studies are normal.  8. She should monitor fetal well-being at home by counting movements after dinner.   Her baby should  move 10 times in 2 hours; otherwise, she should call your office immediately. She is also to call, if she develops any headaches, blurred vision, abdominal pain, bleeding, or spotting, which are signs of preeclampsia. 9. She is to continue to follow with you in your office for ongoing obstetric care. 10. I recommend a follow up ultrasound evaluation in 4 weeks, to check on fetal wellbeing anatomy and growth. Thank you again, doctor, for allowing us to be of service to your patient. If I can be of further assistance, please do not hesitate to call. Sincerely,        Aravind Wan M.D., [facility]    Time spent on the encounter was over 15 minutes including counseling  coordination of care and direct face-to-face contact with the patient. Current encounter billing:  [place] OFFICE OUTPATIENT VISIT LOW MDM 20-30 MINUTES [[id number]]  US OB 14 Plus Weeks Single or First Gestation [64489 Custom]  US Fetal Biophysical Profile WO Non Stress Testing [80486 Custom]    **This report has been created using voice recognition software.  It may contain minor errors     which are inherent in voice recognition technology**

## 2021-02-18 DIAGNOSIS — Z34.93 PRENATAL CARE IN THIRD TRIMESTER: ICD-10-CM

## 2021-02-18 PROBLEM — Z3A.19 19 WEEKS GESTATION OF PREGNANCY: Status: RESOLVED | Noted: 2020-12-02 | Resolved: 2021-02-18

## 2021-02-18 LAB
GLUCOSE TOLERANCE TEST 1 HOUR: 160 MG/DL
GLUCOSE TOLERANCE TEST 2 HOUR: 129 MG/DL
GLUCOSE TOLERANCE TEST FASTING: 89 MG/DL
HCT VFR BLD CALC: 39.1 % (ref 34–48)
HEMOGLOBIN: 12.7 G/DL (ref 11.5–15.5)
MCH RBC QN AUTO: 31.6 PG (ref 26–35)
MCHC RBC AUTO-ENTMCNC: 32.5 % (ref 32–34.5)
MCV RBC AUTO: 97.3 FL (ref 80–99.9)
PDW BLD-RTO: 13.1 FL (ref 11.5–15)
PLATELET # BLD: 194 E9/L (ref 130–450)
PMV BLD AUTO: 10.9 FL (ref 7–12)
RBC # BLD: 4.02 E12/L (ref 3.5–5.5)
WBC # BLD: 11 E9/L (ref 4.5–11.5)

## 2021-02-19 LAB
ABO/RH: NORMAL
ANTIBODY SCREEN: NORMAL

## 2021-03-17 DIAGNOSIS — Z34.93 PRENATAL CARE IN THIRD TRIMESTER: ICD-10-CM

## 2021-03-17 LAB
HCT VFR BLD CALC: 40 % (ref 34–48)
HEMOGLOBIN: 13.2 G/DL (ref 11.5–15.5)
MCH RBC QN AUTO: 31.6 PG (ref 26–35)
MCHC RBC AUTO-ENTMCNC: 33 % (ref 32–34.5)
MCV RBC AUTO: 95.7 FL (ref 80–99.9)
PDW BLD-RTO: 13.3 FL (ref 11.5–15)
PLATELET # BLD: 211 E9/L (ref 130–450)
PMV BLD AUTO: 10.9 FL (ref 7–12)
RBC # BLD: 4.18 E12/L (ref 3.5–5.5)
WBC # BLD: 14.1 E9/L (ref 4.5–11.5)

## 2021-03-21 LAB — GROUP B STREP CULTURE: NORMAL

## 2021-03-22 LAB
C TRACH DNA GENITAL QL NAA+PROBE: NEGATIVE
N. GONORRHOEAE DNA: NEGATIVE
SOURCE: NORMAL

## 2021-04-23 PROBLEM — Z34.02 PRIMIGRAVIDA, SECOND TRIMESTER: Status: ACTIVE | Noted: 2021-04-23

## 2021-04-23 PROBLEM — F41.9 ANXIETY DURING PREGNANCY: Status: ACTIVE | Noted: 2021-04-23

## 2021-04-23 PROBLEM — O99.213 OBESITY AFFECTING PREGNANCY IN THIRD TRIMESTER: Status: ACTIVE | Noted: 2021-04-23

## 2021-04-23 PROBLEM — R87.810 ASCUS WITH POSITIVE HIGH RISK HPV CERVICAL: Status: ACTIVE | Noted: 2021-04-23

## 2021-04-23 PROBLEM — R87.610 ASCUS WITH POSITIVE HIGH RISK HPV CERVICAL: Status: ACTIVE | Noted: 2021-04-23

## 2021-04-23 PROBLEM — O99.340 ANXIETY DURING PREGNANCY: Status: ACTIVE | Noted: 2021-04-23

## 2021-04-23 PROBLEM — F12.91 HISTORY OF MARIJUANA USE: Status: ACTIVE | Noted: 2021-04-23

## 2021-04-24 ENCOUNTER — APPOINTMENT (OUTPATIENT)
Dept: LABOR AND DELIVERY | Age: 26
DRG: 560 | End: 2021-04-24
Payer: COMMERCIAL

## 2021-04-24 ENCOUNTER — HOSPITAL ENCOUNTER (INPATIENT)
Age: 26
LOS: 3 days | Discharge: HOME | DRG: 560 | End: 2021-04-27
Attending: OBSTETRICS & GYNECOLOGY | Admitting: OBSTETRICS & GYNECOLOGY
Payer: COMMERCIAL

## 2021-04-24 PROBLEM — Z3A.40 40 WEEKS GESTATION OF PREGNANCY: Status: ACTIVE | Noted: 2021-04-24

## 2021-04-24 LAB
AMPHETAMINE SCREEN, URINE: NOT DETECTED
BARBITURATE SCREEN URINE: NOT DETECTED
BENZODIAZEPINE SCREEN, URINE: NOT DETECTED
CANNABINOID SCREEN URINE: NOT DETECTED
COCAINE METABOLITE SCREEN URINE: NOT DETECTED
FENTANYL SCREEN, URINE: NOT DETECTED
HCT VFR BLD CALC: 37.5 % (ref 34–48)
HEMOGLOBIN: 12.6 G/DL (ref 11.5–15.5)
Lab: NORMAL
MCH RBC QN AUTO: 31.9 PG (ref 26–35)
MCHC RBC AUTO-ENTMCNC: 33.6 % (ref 32–34.5)
MCV RBC AUTO: 94.9 FL (ref 80–99.9)
METHADONE SCREEN, URINE: NOT DETECTED
OPIATE SCREEN URINE: NOT DETECTED
OXYCODONE URINE: NOT DETECTED
PDW BLD-RTO: 13.6 FL (ref 11.5–15)
PHENCYCLIDINE SCREEN URINE: NOT DETECTED
PLATELET # BLD: 192 E9/L (ref 130–450)
PMV BLD AUTO: 11.4 FL (ref 7–12)
RBC # BLD: 3.95 E12/L (ref 3.5–5.5)
WBC # BLD: 12.1 E9/L (ref 4.5–11.5)

## 2021-04-24 PROCEDURE — 85027 COMPLETE CBC AUTOMATED: CPT

## 2021-04-24 PROCEDURE — 2580000003 HC RX 258: Performed by: OBSTETRICS & GYNECOLOGY

## 2021-04-24 PROCEDURE — 6370000000 HC RX 637 (ALT 250 FOR IP): Performed by: OBSTETRICS & GYNECOLOGY

## 2021-04-24 PROCEDURE — 86901 BLOOD TYPING SEROLOGIC RH(D): CPT

## 2021-04-24 PROCEDURE — 86900 BLOOD TYPING SEROLOGIC ABO: CPT

## 2021-04-24 PROCEDURE — 86850 RBC ANTIBODY SCREEN: CPT

## 2021-04-24 PROCEDURE — 1220000001 HC SEMI PRIVATE L&D R&B

## 2021-04-24 PROCEDURE — 3E0P7VZ INTRODUCTION OF HORMONE INTO FEMALE REPRODUCTIVE, VIA NATURAL OR ARTIFICIAL OPENING: ICD-10-PCS | Performed by: OBSTETRICS & GYNECOLOGY

## 2021-04-24 PROCEDURE — 80307 DRUG TEST PRSMV CHEM ANLYZR: CPT

## 2021-04-24 PROCEDURE — 36415 COLL VENOUS BLD VENIPUNCTURE: CPT

## 2021-04-24 RX ORDER — ONDANSETRON 2 MG/ML
4 INJECTION INTRAMUSCULAR; INTRAVENOUS EVERY 6 HOURS PRN
Status: DISCONTINUED | OUTPATIENT
Start: 2021-04-24 | End: 2021-04-26

## 2021-04-24 RX ORDER — SODIUM CHLORIDE, SODIUM LACTATE, POTASSIUM CHLORIDE, CALCIUM CHLORIDE 600; 310; 30; 20 MG/100ML; MG/100ML; MG/100ML; MG/100ML
INJECTION, SOLUTION INTRAVENOUS CONTINUOUS
Status: DISCONTINUED | OUTPATIENT
Start: 2021-04-24 | End: 2021-04-27 | Stop reason: HOSPADM

## 2021-04-24 RX ADMIN — SODIUM CHLORIDE, POTASSIUM CHLORIDE, SODIUM LACTATE AND CALCIUM CHLORIDE: 600; 310; 30; 20 INJECTION, SOLUTION INTRAVENOUS at 23:00

## 2021-04-24 RX ADMIN — Medication 25 MCG: at 23:25

## 2021-04-25 ENCOUNTER — ANESTHESIA (OUTPATIENT)
Dept: LABOR AND DELIVERY | Age: 26
DRG: 560 | End: 2021-04-25
Payer: COMMERCIAL

## 2021-04-25 ENCOUNTER — ANESTHESIA EVENT (OUTPATIENT)
Dept: LABOR AND DELIVERY | Age: 26
DRG: 560 | End: 2021-04-25
Payer: COMMERCIAL

## 2021-04-25 LAB
ABO/RH: NORMAL
ANTIBODY SCREEN: NORMAL

## 2021-04-25 PROCEDURE — 51701 INSERT BLADDER CATHETER: CPT

## 2021-04-25 PROCEDURE — 1220000001 HC SEMI PRIVATE L&D R&B

## 2021-04-25 PROCEDURE — 6360000002 HC RX W HCPCS: Performed by: NURSE ANESTHETIST, CERTIFIED REGISTERED

## 2021-04-25 PROCEDURE — 0UQKXZZ REPAIR HYMEN, EXTERNAL APPROACH: ICD-10-PCS | Performed by: OBSTETRICS & GYNECOLOGY

## 2021-04-25 PROCEDURE — 2500000003 HC RX 250 WO HCPCS: Performed by: NURSE ANESTHETIST, CERTIFIED REGISTERED

## 2021-04-25 PROCEDURE — 3700000025 EPIDURAL BLOCK: Performed by: ANESTHESIOLOGY

## 2021-04-25 PROCEDURE — 6370000000 HC RX 637 (ALT 250 FOR IP): Performed by: OBSTETRICS & GYNECOLOGY

## 2021-04-25 PROCEDURE — 6360000002 HC RX W HCPCS: Performed by: OBSTETRICS & GYNECOLOGY

## 2021-04-25 PROCEDURE — 0UQMXZZ REPAIR VULVA, EXTERNAL APPROACH: ICD-10-PCS | Performed by: OBSTETRICS & GYNECOLOGY

## 2021-04-25 PROCEDURE — 2500000003 HC RX 250 WO HCPCS: Performed by: ANESTHESIOLOGY

## 2021-04-25 PROCEDURE — 2580000003 HC RX 258: Performed by: OBSTETRICS & GYNECOLOGY

## 2021-04-25 RX ORDER — ACETAMINOPHEN 325 MG/1
650 TABLET ORAL EVERY 4 HOURS PRN
Status: DISCONTINUED | OUTPATIENT
Start: 2021-04-25 | End: 2021-04-27 | Stop reason: HOSPADM

## 2021-04-25 RX ORDER — METHYLERGONOVINE MALEATE 0.2 MG/ML
200 INJECTION INTRAVENOUS PRN
Status: DISCONTINUED | OUTPATIENT
Start: 2021-04-25 | End: 2021-04-27 | Stop reason: HOSPADM

## 2021-04-25 RX ORDER — BUPIVACAINE HYDROCHLORIDE 2.5 MG/ML
INJECTION, SOLUTION EPIDURAL; INFILTRATION; INTRACAUDAL PRN
Status: DISCONTINUED | OUTPATIENT
Start: 2021-04-25 | End: 2021-04-25 | Stop reason: SDUPTHER

## 2021-04-25 RX ORDER — NALOXONE HYDROCHLORIDE 0.4 MG/ML
0.4 INJECTION, SOLUTION INTRAMUSCULAR; INTRAVENOUS; SUBCUTANEOUS PRN
Status: DISCONTINUED | OUTPATIENT
Start: 2021-04-25 | End: 2021-04-26

## 2021-04-25 RX ORDER — NALBUPHINE HCL 10 MG/ML
5 AMPUL (ML) INJECTION EVERY 4 HOURS PRN
Status: DISCONTINUED | OUTPATIENT
Start: 2021-04-25 | End: 2021-04-26

## 2021-04-25 RX ORDER — ACETAMINOPHEN 650 MG
TABLET, EXTENDED RELEASE ORAL
Status: DISCONTINUED
Start: 2021-04-25 | End: 2021-04-25 | Stop reason: WASHOUT

## 2021-04-25 RX ORDER — IBUPROFEN 600 MG/1
600 TABLET ORAL EVERY 6 HOURS PRN
Status: DISCONTINUED | OUTPATIENT
Start: 2021-04-25 | End: 2021-04-27 | Stop reason: HOSPADM

## 2021-04-25 RX ORDER — ONDANSETRON 2 MG/ML
4 INJECTION INTRAMUSCULAR; INTRAVENOUS EVERY 6 HOURS PRN
Status: DISCONTINUED | OUTPATIENT
Start: 2021-04-25 | End: 2021-04-26

## 2021-04-25 RX ORDER — LIDOCAINE HYDROCHLORIDE 10 MG/ML
INJECTION, SOLUTION INFILTRATION; PERINEURAL
Status: DISCONTINUED
Start: 2021-04-25 | End: 2021-04-25 | Stop reason: WASHOUT

## 2021-04-25 RX ORDER — FENTANYL CITRATE 50 UG/ML
INJECTION, SOLUTION INTRAMUSCULAR; INTRAVENOUS PRN
Status: DISCONTINUED | OUTPATIENT
Start: 2021-04-25 | End: 2021-04-25 | Stop reason: SDUPTHER

## 2021-04-25 RX ADMIN — Medication 25 MCG: at 03:35

## 2021-04-25 RX ADMIN — Medication 166.7 ML: at 22:50

## 2021-04-25 RX ADMIN — FENTANYL CITRATE 100 MCG: 50 INJECTION, SOLUTION INTRAMUSCULAR; INTRAVENOUS at 20:22

## 2021-04-25 RX ADMIN — SODIUM CHLORIDE, POTASSIUM CHLORIDE, SODIUM LACTATE AND CALCIUM CHLORIDE: 600; 310; 30; 20 INJECTION, SOLUTION INTRAVENOUS at 21:01

## 2021-04-25 RX ADMIN — Medication 1 MILLI-UNITS/MIN: at 16:01

## 2021-04-25 RX ADMIN — Medication 25 MCG: at 07:45

## 2021-04-25 RX ADMIN — BUPIVACAINE HYDROCHLORIDE 5 ML: 2.5 INJECTION, SOLUTION EPIDURAL; INFILTRATION; INTRACAUDAL; PERINEURAL at 20:22

## 2021-04-25 RX ADMIN — SODIUM CHLORIDE, POTASSIUM CHLORIDE, SODIUM LACTATE AND CALCIUM CHLORIDE: 600; 310; 30; 20 INJECTION, SOLUTION INTRAVENOUS at 18:00

## 2021-04-25 RX ADMIN — Medication 15 ML/HR: at 18:15

## 2021-04-25 RX ADMIN — BUTORPHANOL TARTRATE 2 MG: 2 INJECTION, SOLUTION INTRAMUSCULAR; INTRAVENOUS at 16:23

## 2021-04-25 RX ADMIN — SODIUM CHLORIDE, POTASSIUM CHLORIDE, SODIUM LACTATE AND CALCIUM CHLORIDE: 600; 310; 30; 20 INJECTION, SOLUTION INTRAVENOUS at 14:19

## 2021-04-25 RX ADMIN — Medication 25 MCG: at 11:45

## 2021-04-25 ASSESSMENT — PAIN - FUNCTIONAL ASSESSMENT
PAIN_FUNCTIONAL_ASSESSMENT: 0-10
PAIN_FUNCTIONAL_ASSESSMENT: 0-10

## 2021-04-25 NOTE — H&P
 LYMPH NODE BIOPSY      TONSILLECTOMY AND ADENOIDECTOMY         Allergies:  Patient has no known allergies.     Social History:    Social History     Socioeconomic History    Marital status: Single     Spouse name: Not on file    Number of children: Not on file    Years of education: Not on file    Highest education level: Not on file   Occupational History    Not on file   Social Needs    Financial resource strain: Not on file    Food insecurity     Worry: Not on file     Inability: Not on file    Transportation needs     Medical: Not on file     Non-medical: Not on file   Tobacco Use    Smoking status: Never Smoker    Smokeless tobacco: Never Used   Substance and Sexual Activity    Alcohol use: Not Currently     Frequency: Never    Drug use: Yes     Types: Marijuana    Sexual activity: Yes     Partners: Male   Lifestyle    Physical activity     Days per week: Not on file     Minutes per session: Not on file    Stress: Not on file   Relationships    Social connections     Talks on phone: Not on file     Gets together: Not on file     Attends Anglican service: Not on file     Active member of club or organization: Not on file     Attends meetings of clubs or organizations: Not on file     Relationship status: Not on file    Intimate partner violence     Fear of current or ex partner: Not on file     Emotionally abused: Not on file     Physically abused: Not on file     Forced sexual activity: Not on file   Other Topics Concern    Not on file   Social History Narrative    Not on file       Family History:       Problem Relation Age of Onset    Alcohol Abuse Father     Other Mother        Medications Prior to Admission:  Medications Prior to Admission: Prenatal Vit-Fe Fumarate-FA (PRENATAL VITAMIN) 27-0.8 MG TABS, Take 1 tablet by mouth daily    REVIEW OF SYSTEMS:  CONSTITUTIONAL:  negative  RESPIRATORY:  negative  CARDIOVASCULAR:  negative  GASTROINTESTINAL:  negative  ALLERGIC/IMMUNOLOGIC: negative  NEUROLOGICAL:  negative  BEHAVIOR/PSYCH:  negative    PHYSICAL EXAM:  Vitals:    04/24/21 2329   Weight: 277 lb (125.6 kg)   Height: 5' 7\" (1.702 m)     General appearance:  awake, alert, cooperative, no apparent distress, and appears stated age  Neurologic:  Awake, alert, oriented to name, place and time. Skin: warm, dry, normal color, no rashes  Head:  NC,AT,NT  Eyes:  Sclerae white, pupils equal and reactive, red reflex normal bilaterally  Ears:  Well-positioned, well-formed pinnae; Hearing: intact  Nose:  Clear, normal mucosa  Throat:  Lips, tongue and mucosa are pink, moist and intact; no exudate  Neck:  Supple, symmetrical  Heart:  Regular rate & rhythm, S1 S2, no murmurs, rubs, or gallops  Lungs:  No increased work of breathing, good air exchange, CTA b/l. Abdomen:  Soft, non tender, gravid, consistent with her gestational age, EFW by Leopald's manouever was 8#  Extremities: No clubbing, cyanosis, cords; No calf tenderness; Edema: trace  Pulses:  Strong equal distal pulses, brisk capillary refill  Fetal heart rate:  Reassuring.   Pelvis:  Adequate pelvis  Cervix: Closed / 30% / soft / -3 / posterior, Michael Score: 2  Contraction frequency: Occasional, irregular  Membranes:  Intact    Labs:  Recent Results (from the past 72 hour(s))   CBC    Collection Time: 04/24/21 11:00 PM   Result Value Ref Range    WBC 12.1 (H) 4.5 - 11.5 E9/L    RBC 3.95 3.50 - 5.50 E12/L    Hemoglobin 12.6 11.5 - 15.5 g/dL    Hematocrit 37.5 34.0 - 48.0 %    MCV 94.9 80.0 - 99.9 fL    MCH 31.9 26.0 - 35.0 pg    MCHC 33.6 32.0 - 34.5 %    RDW 13.6 11.5 - 15.0 fL    Platelets 832 382 - 505 E9/L    MPV 11.4 7.0 - 12.0 fL   TYPE AND SCREEN    Collection Time: 04/24/21 11:00 PM   Result Value Ref Range    ABO/Rh A POS     Antibody Screen NEG    Urine Drug Screen    Collection Time: 04/24/21 11:00 PM   Result Value Ref Range    Amphetamine Screen, Urine NOT DETECTED Negative <1000 ng/mL    Barbiturate Screen, Ur NOT DETECTED Negative < 200 ng/mL    Benzodiazepine Screen, Urine NOT DETECTED Negative < 200 ng/mL    Cannabinoid Scrn, Ur NOT DETECTED Negative < 50ng/mL    Cocaine Metabolite Screen, Urine NOT DETECTED Negative < 300 ng/mL    Opiate Scrn, Ur NOT DETECTED Negative < 300ng/mL    PCP Screen, Urine NOT DETECTED Negative < 25 ng/mL    Methadone Screen, Urine NOT DETECTED Negative <300 ng/mL    Oxycodone Urine NOT DETECTED Negative <100 ng/mL    FENTANYL SCREEN, URINE NOT DETECTED Negative <1 ng/mL    Drug Screen Comment: see below        ASSESSMENT:  31 y.o. W female at [address] induction of labor with Cytotec (BS = 2)  History of marijuana use weekly  Obesity  Anxiety with early first trimester exposure to BuSpar ([address] when found out pregnant)  ASCUS positive high-risk HPV on antepartum Pap  Patient Active Problem List   Diagnosis    Primigravida, third trimester- transfer from [facility] at 27 weeks.  Obesity affecting pregnancy in third trimester    History of marijuana use- using weekly    Anxiety during pregnancy- Stopped Bupsar once found out pregnant    ASCUS with positive high risk HPV cervical 10/15/2020 antepartum Pap smear - Had Mddtrimester colposcopy with Dr Paredes People - needs PP colposcopy    40 weeks gestation of pregnancy     Fetus: Reassuring  GBS: negative    PLAN:  Admit to labor and delivery per routine  Continue electronic fetal monitoring  IV/IV fluids  CBC/type and screen/UDS  Up with assistance. Cytotec 25 mcg every 4 hours until cervix is favorable or labor is active. Stadol 2 mg every 3 hours as needed until labor is active. May have epidural at request once labor is active. Patient is aware of the risk of Cytotec induction including the increased risk of  section and associated risks. Questions answered. Bouchra Arias M.D.  FACOG  2021 7:49 AM

## 2021-04-25 NOTE — PROGRESS NOTES
Updated Dr. Aman Nguyen 2/70/-3. SROM at 1535 with clear fluid. Ctx's 3-4 min apart. Patient comfortable at this time.  New orders to start pitocin

## 2021-04-25 NOTE — PROGRESS NOTES
40w presents to L&D for scheduled IOL. Pt denies LOF, VB, ctx. Pt placed on monitor. Call light within reach.  Will call for induction orders

## 2021-04-25 NOTE — PROGRESS NOTES
IUP at 40w2d, G-1 P-0 , Dr Jose German called in , orders obtained fht's reassuring, Denies pain. On Cytotec VE 1 cm

## 2021-04-25 NOTE — ANESTHESIA PRE PROCEDURE
for: PREGTESTUR, PREGSERUM, HCG, HCGQUANT     ABGs: No results found for: PHART, PO2ART, MTM0ZCF, BXG5AHH, BEART, M1HENDDA     Type & Screen (If Applicable):  No results found for: LABABO, LABRH    Drug/Infectious Status (If Applicable):  No results found for: HIV, HEPCAB    COVID-19 Screening (If Applicable): No results found for: COVID19        Anesthesia Evaluation  Patient summary reviewed no history of anesthetic complications:   Airway: Mallampati: II  TM distance: >3 FB   Neck ROM: full  Mouth opening: > = 3 FB Dental: normal exam         Pulmonary:Negative Pulmonary ROS and normal exam  breath sounds clear to auscultation                             Cardiovascular:Negative CV ROS            Rhythm: regular  Rate: normal                    Neuro/Psych:   (+) depression/anxiety             GI/Hepatic/Renal: Neg GI/Hepatic/Renal ROS            Endo/Other: Negative Endo/Other ROS                    Abdominal:           Vascular: negative vascular ROS. Anesthesia Plan      epidural     ASA 2       Induction: intravenous. Anesthetic plan and risks discussed with patient.         Attending anesthesiologist reviewed and agrees with Rhonda Jasmine [phone], APRN - CRNA   4/25/2021

## 2021-04-26 PROCEDURE — 6370000000 HC RX 637 (ALT 250 FOR IP): Performed by: OBSTETRICS & GYNECOLOGY

## 2021-04-26 PROCEDURE — 7200000001 HC VAGINAL DELIVERY

## 2021-04-26 PROCEDURE — 1220000000 HC SEMI PRIVATE OB R&B

## 2021-04-26 RX ORDER — PRENATAL WITH FERROUS FUM AND FOLIC ACID 3080; 920; 120; 400; 22; 1.84; 3; 20; 10; 1; 12; 200; 27; 25; 2 [IU]/1; [IU]/1; MG/1; [IU]/1; MG/1; MG/1; MG/1; MG/1; MG/1; MG/1; UG/1; MG/1; MG/1; MG/1; MG/1
1 TABLET ORAL
Status: DISCONTINUED | OUTPATIENT
Start: 2021-04-26 | End: 2021-04-27 | Stop reason: HOSPADM

## 2021-04-26 RX ORDER — DOCUSATE SODIUM 100 MG/1
100 CAPSULE, LIQUID FILLED ORAL 2 TIMES DAILY
Status: DISCONTINUED | OUTPATIENT
Start: 2021-04-26 | End: 2021-04-27 | Stop reason: HOSPADM

## 2021-04-26 RX ORDER — MODIFIED LANOLIN
OINTMENT (GRAM) TOPICAL PRN
Status: DISCONTINUED | OUTPATIENT
Start: 2021-04-26 | End: 2021-04-27 | Stop reason: HOSPADM

## 2021-04-26 RX ADMIN — IBUPROFEN 600 MG: 600 TABLET ORAL at 01:37

## 2021-04-26 RX ADMIN — BENZOCAINE AND LEVOMENTHOL: 200; 5 SPRAY TOPICAL at 02:58

## 2021-04-26 RX ADMIN — IBUPROFEN 600 MG: 600 TABLET ORAL at 09:17

## 2021-04-26 RX ADMIN — Medication: at 02:58

## 2021-04-26 RX ADMIN — DOCUSATE SODIUM 100 MG: 100 CAPSULE, LIQUID FILLED ORAL at 01:37

## 2021-04-26 RX ADMIN — ACETAMINOPHEN 650 MG: 325 TABLET ORAL at 21:00

## 2021-04-26 RX ADMIN — ACETAMINOPHEN 650 MG: 325 TABLET ORAL at 10:08

## 2021-04-26 RX ADMIN — DOCUSATE SODIUM 100 MG: 100 CAPSULE, LIQUID FILLED ORAL at 09:17

## 2021-04-26 RX ADMIN — ACETAMINOPHEN 650 MG: 325 TABLET ORAL at 03:15

## 2021-04-26 RX ADMIN — DOCUSATE SODIUM 100 MG: 100 CAPSULE, LIQUID FILLED ORAL at 21:00

## 2021-04-26 RX ADMIN — ACETAMINOPHEN 650 MG: 325 TABLET ORAL at 16:59

## 2021-04-26 RX ADMIN — IBUPROFEN 600 MG: 600 TABLET ORAL at 16:59

## 2021-04-26 RX ADMIN — IBUPROFEN 600 MG: 600 TABLET ORAL at 22:47

## 2021-04-26 ASSESSMENT — PAIN SCALES - GENERAL
PAINLEVEL_OUTOF10: 3
PAINLEVEL_OUTOF10: 2

## 2021-04-26 NOTE — ANESTHESIA POSTPROCEDURE EVALUATION
Department of Anesthesiology  Postprocedure Note    Patient: Ami Jackson  MRN: [id number]  YOB: 1995  Date of evaluation: 4/26/2021  Time:  11:00 AM     Procedure Summary     Date: 04/25/21 Room / Location:     Anesthesia Start: 1752 Anesthesia Stop: 2244    Procedure: Labor Analgesia Diagnosis:     Scheduled Providers:  Responsible Provider: Jo Arias DO    Anesthesia Type: epidural ASA Status: 2          Anesthesia Type: epidural    Leslie Phase I:      Leslie Phase II:      Last vitals: Reviewed and per EMR flowsheets.        Anesthesia Post Evaluation    Patient location during evaluation: bedside  Patient participation: complete - patient participated  Level of consciousness: awake and alert  Pain score: 0  Airway patency: patent  Nausea & Vomiting: no nausea and no vomiting  Complications: no  Cardiovascular status: hemodynamically stable  Respiratory status: acceptable and room air  Hydration status: euvolemic

## 2021-04-26 NOTE — PROGRESS NOTES
[employer] [place] Hearing screening results were discussed with parent. Questions answered. Brochure given to parent. Advised to monitor developmental milestones and contact physician for any concerns.    Raimundo Monte

## 2021-04-26 NOTE — LACTATION NOTE
Instructed on normal infant behavior in the first 12-24 hrs, benefits of skin to skin and components of safe positioning, encouraged rooming-in and avoidance of pacifier use until breastfeeding is well established. Reviewed waking techniques and the importance of frequent feedings- 8-12 times/ 24 hrs. Taught hand expression and encouraged to express drops of colostrum at start of feeding. Reviewed feeding cues and expected urine/stool output. Encouraged to feed infant as often and for as long as the infant wishes to do so. Reviewed [place] learning thuan. Assisted with different positions but baby is sleepy. Encouraged mom to call me when baby is ready to feed. Placed baby skin to skin with mom.   Jesús, [facility]

## 2021-04-26 NOTE — PROGRESS NOTES
Pt turned onto left side, pt pain 2/10 now.  fhr with questionable lates, pt then turned to the right adjust efm. cta

## 2021-04-26 NOTE — PROGRESS NOTES
Pt up to br to void without difficulty. pericare done.  Mom returned to bed, will notify mom baby of transfer

## 2021-04-26 NOTE — PROGRESS NOTES
RN remained at bedside throughout pushing. EFM continuously assessed. Vaginal delivery of viable infant girl, skin to skin initiated, apgars 8/9, mother and  stable.

## 2021-04-26 NOTE — PROGRESS NOTES
Pt complete -1/ 0 had pt practice push , pt became sick and vomitted 500ml undigested food. Pt denies need for nausea meds. Called dr Finn Valderrama pt complete and pushing.

## 2021-04-26 NOTE — FLOWSHEET NOTE
Pt assisted to bathroom, voided. Instructed on shi care. No lightheadedness when up. Lochia RNA, no clots. Pt back to bed. Pt to call for further assistance to bathroom if needed.

## 2021-04-26 NOTE — FLOWSHEET NOTE
Pt admitted to room 317. Oriented to room, phones, call light and bedside educational material. Pt informed of current visitation policy. Pt informed of pain medicine available as needed. Infant safety reviewed. Pt to call for assist to bathroom with first void.

## 2021-04-26 NOTE — L&D DELIVERY NOTE
[[id number]]   7 pounds 14 ounces  3560 g     APGARS:     Information for the patient's :  Malcom Membreno [[id number]]   1 minute: 8  5 minute: 9    Anesthesia:  epidural anesthesia    Application and Delivery:  31 y.o. W female  at 44w3d admitted for postdates labor induction with Cytotec (4 doses) who progressed to complete post SROM and Pitocin augmentation and delivered Cephalic, left occiput anterior presentation via  @ 2244, under epidural anesthesia anesthesia,  over an intact perineum with resulting vaginal (5 and 7:00 at the hymen) and bilateral labia minora lacerations, without dystocia or complication, a Live Born Female infant, weighing 7# 14oz, 3560 grams, Clear fluid at delivery, bulb suctioned on perineum. A nuchal cord was not present. Spontaneous cry,  Apgar's 1 minute:  8; 5 minute:  9. The placenta was delivered with gentle traction and was noted to be intact, whole and that the umbilical cord had three vessels noted. Episiotomy was not needed due to a spontaneous vaginal lacerations at the hymen (4 and 7:00) and bilateral labia minora. Repair performed figure-of-eight stitches at the hymen and simple interrupted stitches of the labia minora, per routine with  Vicryl 3-0 suture. Cervix, rectum, sphincter intact. Sponge, needle, and instrument counts correct x 2.     Delivery Summary:  Mother's Information    Labor Events     labor?: No  Rupture type: Spontaneous=SROM  Fluid color: Clear  Fluid odor: None  Augmentation: Oxytocin     Mother Delivery Information    Episiotomy: None  Lacerations: None  # of Repair Packets: 2  Vaginal Delivery Est. Blood Loss (mL): 200  Surgical or Additional Est. Blood Loss (mL): 0 (View Only): Edit in Flowsheets   Combined Est. Blood Loss (mL): 200     Withblossom, Baby Girl Nirmala [[id number]]    Labor Events     labor?: No   steroids?: None  Cervical ripening date/time: 21 23:25:00   Cervical ripening type: Misoprostol  Antibiotics received during labor?: No  Rupture Identifier: Sac 1   Rupture date/time: 21 15:35:00   Rupture type: Spontaneous=SROM  Fluid color: Clear  Fluid odor: None  Induction: Cervidil  Indications for induction: Post-term Gestation  Augmentation: Oxytocin  Indications for augmentation: Ineffective Contraction Pattern  Labor complications: None     Labor Event Times    Labor onset date/time: 21   Dilation complete date/time:  21 EDT   Start pushin2021      Anesthesia    Method: Epidural     Assisted Delivery Details    Forceps attempted?: No  Vacuum extractor attempted?: No     Shoulder Dystocia    Shoulder dystocia present?: No     [place] Presentation    Presentation: Vertex  Position: Right  _: Occiput  _: Anterior     [place] Information    Head delivery date/time: 2021 22:44:00   Changing the 's delivery date/time could affect patient care.:    Delivery date/time:  21   Delivery type: Vaginal, Spontaneous     Delivery Providers    Delivering clinician: Savi Gamez MD   Provider Role    Singh Kennedy RN Delivery Nurse    Andrew Amaro RN Delivery Nurse    Kellee Alford, RN Registered Nurse      Cord    Vessels: 3 Vessels  Complications: None  Delayed cord clamping?: Yes  Cord clamped date/time: 2021  Cord blood disposition: Lab  Gases sent?: Yes  Stem cell collection (by provider):  No     Placenta    Date/time: 2021 22:50:00  Removal: Spontaneous  Appearance: Intact  Disposition: Refrigerator     Delivery Resuscitation    Method: Bulb Suction, Stimulation     Apgars    Living status: Living  Apgars   1 Minute:  5 Minute:  10 Minute 15 Minute 20 Minute   Skin Color: 0  1       Heart Rate: 2  2       Reflex Irritability: 2  2       Muscle Tone: 2  2       Respiratory Effort: 2  2       Total: 8  9               Apgars Assigned By: Bertin Tyler     Skin to Skin    Skin to skin initiation date/time: 21 22:45:00   Skin to skin with: Mother  Skin to skin end date/time:        [place] Measurements    Weight: 3560 g Length: 52.1 cm   Head circumference: 35 cm       Delivery Information    Episiotomy: None  Perineal lacerations: None    Labial laceration: bilateral Repaired: Yes   Vaginal laceration: Yes    Cervical laceration: No    Vaginal delivery est. blood loss (mL): 200  Surgical or additional est. blood loss (mL): 0 (View Only): Edit in Flowsheets   Combined est. blood loss (mL): 200     Vaginal Delivery Counts    Initial count personnel: Nate Mcgovern  Initial count verified by: Cheo Katz   4x4:  Needles:  Instruments:  Lap Pads:  Sponges:    Initial counts:         Final counts:         Final count personnel: Ita Hodge  Final count verified by: CONNOR  Accurate final count?: Yes  Final vaginal sweep completed: Yes     Other Procedures    Procedures: None     Labor Length    1st stage: 5h 44m  2nd stage: 1h 29m  3rd stage: 0h 06m    Specimen:  None. Estimated blood loss: EBL (mL): 200(Filed from Delivery Summary)       Condition:  infant stable to general nursery and mother stable to maternity    Blood Type and Rh: A POS    Rubella Immunity Status:   Immune           Infant Feeding:    Breast    Attending Attestation: I performed the procedure.     Letitia Moran MD, [address] [facility]  2021 11:28 PM

## 2021-04-26 NOTE — PROGRESS NOTES
PPD #1    Patient:  Russ Tomas Date:  4/24/2021  9:52 PM  Medical Record Number:  [id number]   Today's Date: 4/26/2021    S: No complaints; tolerating diet: yes -general; ambulating well: yes -up in room and in halls; voiding without difficulty:  yes -no urinary complaints; bm: denies; flatus: yes -normal; pain meds appropriate: yes -ibuprofen and Tylenol; vaginal bleeding: Heavier than a period.     O:   Vitals:    04/26/21 0053 04/26/21 0215 04/26/21 0611 04/26/21 0733   BP: 138/79 117/72 112/72 109/64   Pulse: 101 95 84 92   Resp:  16 16 18   Temp:  98.1 °F (36.7 °C) 97.8 °F (36.6 °C) 97.8 °F (36.6 °C)   TempSrc:    Oral   SpO2:       Weight:       Height:         Gen: A&O, cooperative, pleasant   Heart: RRR   Lungs: CTA b/l   Abd; soft, NT, non distended, +BS  Back: no CVA or paraspinal tenderness   Ext: No clubbing, cyanosis, edema:no , no cords palpable, no calf tenderness   Neuro: intact   Uterus: firm, well contracted, nt   Perineum: intact, healing well   Eugenia pad: staining only, thin lochia    Lab Results   Component Value Date    HGB 12.6 04/24/2021    HCT 37.5 04/24/2021      Recent Results (from the past 72 hour(s))   CBC    Collection Time: 04/24/21 11:00 PM   Result Value Ref Range    WBC 12.1 (H) 4.5 - 11.5 E9/L    RBC 3.95 3.50 - 5.50 E12/L    Hemoglobin 12.6 11.5 - 15.5 g/dL    Hematocrit 37.5 34.0 - 48.0 %    MCV 94.9 80.0 - 99.9 fL    MCH 31.9 26.0 - 35.0 pg    MCHC 33.6 32.0 - 34.5 %    RDW 13.6 11.5 - 15.0 fL    Platelets 831 877 - 484 E9/L    MPV 11.4 7.0 - 12.0 fL   TYPE AND SCREEN    Collection Time: 04/24/21 11:00 PM   Result Value Ref Range    ABO/Rh A POS     Antibody Screen NEG    Urine Drug Screen    Collection Time: 04/24/21 11:00 PM   Result Value Ref Range    Amphetamine Screen, Urine NOT DETECTED Negative <1000 ng/mL    Barbiturate Screen, Ur NOT DETECTED Negative < 200 ng/mL    Benzodiazepine Screen, Urine NOT DETECTED Negative < 200 ng/mL    Cannabinoid Scrn, Ur NOT DETECTED Negative < 50ng/mL    Cocaine Metabolite Screen, Urine NOT DETECTED Negative < 300 ng/mL    Opiate Scrn, Ur NOT DETECTED Negative < 300ng/mL    PCP Screen, Urine NOT DETECTED Negative < 25 ng/mL    Methadone Screen, Urine NOT DETECTED Negative <300 ng/mL    Oxycodone Urine NOT DETECTED Negative <100 ng/mL    FENTANYL SCREEN, URINE NOT DETECTED Negative <1 ng/mL    Drug Screen Comment: see below        A: 31 y.o. female  at 40w1d weeks  PPD #1 S/P ; Episiotomy was not needed due to spontaneous vaginal and bilateral labia minora lacerations  Patient Active Problem List   Diagnosis    Primigravida, third trimester- transfer from [facility] at 27 weeks.  Obesity affecting pregnancy in third trimester    History of marijuana use- using weekly    Anxiety during pregnancy- Stopped Bupsar once found out pregnant    ASCUS with positive high risk HPV cervical 10/15/2020 antepartum Pap smear - Had Midtrimester colposcopy with Dr Tuttle Adjutant - needs PP colposcopy    40 1/7 weeks gestation of pregnancy     (normal spontaneous vaginal delivery)    Term birth of  female   Vieyra Obstetric vaginal laceration (at hymen 5 and 7:00) without perineal laceration    Obstetric labial laceration (b/l labia minora), delivered, current hospitalization       P: Routine PP care. Resume prenatal vitamin with iron daily. Continue ibuprofen and Tylenol as needed for pain.   [address] Maria Alejandra Guallpa MD [address] [facility]  2021 8:58 AM

## 2021-04-27 VITALS
SYSTOLIC BLOOD PRESSURE: 120 MMHG | BODY MASS INDEX: 43.47 KG/M2 | DIASTOLIC BLOOD PRESSURE: 59 MMHG | OXYGEN SATURATION: 96 % | HEART RATE: 86 BPM | HEIGHT: 67 IN | WEIGHT: 277 LBS | TEMPERATURE: 98.4 F | RESPIRATION RATE: 16 BRPM

## 2021-04-27 PROBLEM — O99.340 ANXIETY DURING PREGNANCY: Status: RESOLVED | Noted: 2021-04-23 | Resolved: 2021-04-27

## 2021-04-27 PROBLEM — F41.9 ANXIETY DURING PREGNANCY: Status: RESOLVED | Noted: 2021-04-23 | Resolved: 2021-04-27

## 2021-04-27 PROBLEM — Z34.02 PRIMIGRAVIDA, SECOND TRIMESTER: Status: RESOLVED | Noted: 2021-04-23 | Resolved: 2021-04-27

## 2021-04-27 PROBLEM — Z3A.40 40 WEEKS GESTATION OF PREGNANCY: Status: RESOLVED | Noted: 2021-04-24 | Resolved: 2021-04-27

## 2021-04-27 PROBLEM — O99.213 OBESITY AFFECTING PREGNANCY IN THIRD TRIMESTER: Status: RESOLVED | Noted: 2021-04-23 | Resolved: 2021-04-27

## 2021-04-27 PROCEDURE — 6370000000 HC RX 637 (ALT 250 FOR IP): Performed by: OBSTETRICS & GYNECOLOGY

## 2021-04-27 RX ORDER — MODIFIED LANOLIN
1 OINTMENT (GRAM) TOPICAL PRN
Refills: 0 | COMMUNITY
Start: 2021-04-27

## 2021-04-27 RX ORDER — PSEUDOEPHEDRINE HCL 30 MG
100 TABLET ORAL 2 TIMES DAILY PRN
COMMUNITY
Start: 2021-04-27

## 2021-04-27 RX ORDER — IBUPROFEN 600 MG/1
600 TABLET ORAL EVERY 6 HOURS PRN
Qty: 30 TABLET | Refills: 0 | Status: SHIPPED | OUTPATIENT
Start: 2021-04-27

## 2021-04-27 RX ADMIN — METFORMIN HYDROCHLORIDE 1 TABLET: 500 TABLET, EXTENDED RELEASE ORAL at 09:13

## 2021-04-27 RX ADMIN — ACETAMINOPHEN 650 MG: 325 TABLET ORAL at 09:13

## 2021-04-27 RX ADMIN — IBUPROFEN 600 MG: 600 TABLET ORAL at 06:00

## 2021-04-27 RX ADMIN — DOCUSATE SODIUM 100 MG: 100 CAPSULE, LIQUID FILLED ORAL at 09:13

## 2021-04-27 ASSESSMENT — PAIN SCALES - GENERAL: PAINLEVEL_OUTOF10: 7

## 2021-04-27 ASSESSMENT — PAIN DESCRIPTION - RADICULAR PAIN: RADICULAR_PAIN: ABSENT

## 2021-04-27 NOTE — CARE COORDINATION
This note will not be viewable in [facility] for the following reason(s). : Unable to separate mother vs.  PHI      SW Discharge Planning   SW noted mother with early [facility] usage ( per chart documentation up til March)    SW met with Donald Mills ( [phone]) first time mother to Alyssa Beckman ( 21) and introduced self and role. Reported father of the baby, Abiodun Dumont ( 10/31/91) was noted to be sleeping on the couch; Nirmala gave SW permission to speak freely in front of [address]. Han Sam reported that she resides at the address listed in the chart with her dad,  Marlen Pfeiffer. Han Sam reported that she is currently employed at [employer] and baby will be added to her [employer]. Per Han Vargas, prenatal care was with Dr. Freedom Maria, and pediatric care will be with [facility]. Nirmala Reported that she has all needed items including a car seat and pack and play. We discussed safe sleep practices. Nirmala was agreeable to a [facility] and WIC referral. Han Sam  denied any past or current history of children services involvement, legal issues, substance abuse aside from [facility], domestic violence or mental health diagnosis. We discussed awareness of Post Partum Depression and encouraged contact with her OB if any problems arise. Nirmala admitted daily usage of THC through the beginning of pregnancy, then she reported she weaned down gradually doing it once a week until stopping in March. Nirmala expressed understanding for a [employer] ( [phone]) referral due to [facility] usage. During assessment Nirmala was just waking up however was pleasant and polite. Baby was NOT in the room with her, so SW was unable to assess bonding.      [facility] ( [phone][facility] to Sergei Olivier in intake     PLAN    Baby can NOT be discharged home until [facility] ( [phone]) provides disposition  SW to continue communication with nursing staff and [facility] ( [phone])    WIC and [facility] referrals were completed     Electronically signed by THOMAS Jade on 4/27/2021 at 8:51 AM

## 2021-04-27 NOTE — PROGRESS NOTES
PPD #2     Patient:  Calixto Shock Date:  4/24/2021  9:52 PM  Medical Record Number:  [id number]   Today's Date: 4/27/2021    S: No complaints; tolerating diet: yes -General; ambulating well: yes -up in room and halls; voiding without difficulty:  yes -no urinary complaints; bm: yes -normal, yesterday; flatus: yes -normal; pain meds appropriate: yes -ibuprofen 600 mg; vaginal bleeding: Less than a period.     O:   Vitals:    04/26/21 0733 04/26/21 1125 04/26/21 2253 04/27/21 0714   BP: 109/64 126/78 106/60 (!) 120/59   Pulse: 92 86 89 86   Resp: 18 18 16 16   Temp: 97.8 °F (36.6 °C) 98.2 °F (36.8 °C) 97.5 °F (36.4 °C) 98.4 °F (36.9 °C)   TempSrc: Oral Oral Oral Oral   SpO2:       Weight:       Height:         Gen: A&O, cooperative, pleasant   Heart: RRR   Lungs: CTA b/l   Abd; soft, NT, non distended, +BS  Back: no CVA or paraspinal tenderness   Ext: No clubbing, cyanosis, edema:trace , no cords palpable, no calf tenderness   Neuro: intact   Uterus: firm, well contracted, nt   Perineum: intact, healing well   Eugenia pad: staining only, thin lochia    Lab Results   Component Value Date    HGB 12.6 04/24/2021    HCT 37.5 04/24/2021      Recent Results (from the past 72 hour(s))   CBC    Collection Time: 04/24/21 11:00 PM   Result Value Ref Range    WBC 12.1 (H) 4.5 - 11.5 E9/L    RBC 3.95 3.50 - 5.50 E12/L    Hemoglobin 12.6 11.5 - 15.5 g/dL    Hematocrit 37.5 34.0 - 48.0 %    MCV 94.9 80.0 - 99.9 fL    MCH 31.9 26.0 - 35.0 pg    MCHC 33.6 32.0 - 34.5 %    RDW 13.6 11.5 - 15.0 fL    Platelets 233 040 - 648 E9/L    MPV 11.4 7.0 - 12.0 fL   TYPE AND SCREEN    Collection Time: 04/24/21 11:00 PM   Result Value Ref Range    ABO/Rh A POS     Antibody Screen NEG    Urine Drug Screen    Collection Time: 04/24/21 11:00 PM   Result Value Ref Range    Amphetamine Screen, Urine NOT DETECTED Negative <1000 ng/mL    Barbiturate Screen, Ur NOT DETECTED Negative < 200 ng/mL    Benzodiazepine Screen, Urine NOT DETECTED Negative < 200 ng/mL    Cannabinoid Scrn, Ur NOT DETECTED Negative < 50ng/mL    Cocaine Metabolite Screen, Urine NOT DETECTED Negative < 300 ng/mL    Opiate Scrn, Ur NOT DETECTED Negative < 300ng/mL    PCP Screen, Urine NOT DETECTED Negative < 25 ng/mL    Methadone Screen, Urine NOT DETECTED Negative <300 ng/mL    Oxycodone Urine NOT DETECTED Negative <100 ng/mL    FENTANYL SCREEN, URINE NOT DETECTED Negative <1 ng/mL    Drug Screen Comment: see below        A: 31 y.o. female  at 40w1d weeks  PPD #2 S/P ; Episiotomyvwas not needed due to spontaneous vaginal and bilateral labia minora lacerations  Patient Active Problem List   Diagnosis    Primigravida, third trimester- [facility] at 27 weeks.  Obesity affecting pregnancy in third trimester    History of marijuana use- using weekly    Anxiety during pregnancy- Stopped Bupsar once found out pregnant    ASCUS with positive high risk HPV cervical 10/15/2020 antepartum Pap smear - Had Midtrimester colposcopy with Dr Colette Mc - needs PP colposcopy    40 1/7 weeks gestation of pregnancy     (normal spontaneous vaginal delivery)    Term birth of  female   [employer] Obstetric vaginal laceration (at hymen 5 and 7:00) without perineal laceration    Obstetric labial laceration (b/l labia minora), delivered, current hospitalization       P: Routine PP care. Home today with instructions and precautions  Continue Prenatal Vitamins with iron. Rx Motrin 60 mg. Follow up in office 6 weeks postpartum. Will need colposcopy after 6 weeks postpartum visit.     Eufemia Milton MD FACOG  2021 10:16 AM

## 2021-04-27 NOTE — CARE COORDINATION
This note will not be viewable in [facility] for the following reason(s). Agency: Unable to separate mother vs.  [address]       Discharge Planning     SW called [facility] ( [phone]) and spoke with , Pavan Loco, who reported that [facility] ( [phone]) will NOT be involved at this time     PLAN    Baby CAN be discharged home when medically ready, children services will NOT be involved at this time.      Electronically signed by THOMAS Castellon on 2021 at 11:34 AM

## 2021-04-27 NOTE — DISCHARGE INSTR - ACTIVITY
After Your Delivery Discharge Instructions    What to do after you leave the hospital:    Recommended diet: regular diet  Recommended activity: No driving for 1 weeks, no sex or tampon use for 6 weeks. No douching. No heavy lifting (greater than baby and carrier) for 6 weeks. Initially, avoid frequent ambulation with stairs - start slowly then increase as tolerated. You may return to work in 6 weeks. Showers are fine - avoid bath tubs, hot tubs, swimming. Follow-up in 6 weeks for final postpartum visit. You will be scheduled for a follow-up colposcopy after that visit.     Call the Physician with any of these signs and symptoms:    Warning signs regarding stitches:  · \"Popping\" of stitches  · Foul smelling discharge or pus  · More redness or streaks around stitches than before    Perineum / episiotomy care:  · Continue care as in the hospital (sitz baths, squirt bottle, etc.)  · No tub baths until OK'd by your Physician , showers are fine     After your delivery - signs and symptoms to watch for:  · Fever - Oral temperature greater than 100.4 degrees Fahrenheit  · Foul-smelling vaginal discharge  · Headache unrelieved by \"pain meds\"  · Difficulty urinating  · Breasts reddened, hard, hot to the touch  · Nipple discharge which is foul-smelling or contains pus  · Increased pain at the site of the laceration repair  · Difficulty breathing with or without chest pain  · New calf pain especially if only on one side  · Sudden, continuing increased vaginal bleeding (heavier than a period) with or without clots  · Unrelieved feelings of:  · Inability to cope  · Sadness  · Anxiety  · Lack of interest in baby  · Insomnia  · Crying     What to do at home:  · Resume activity gradually   · Don't lift anything heavier than baby and carrier until OK'd by your Physician   · No sex until OK'd by your Physician  · Eat regular nutritious meals  · Take pain medication as prescribed whenever you need them  · To avoid/relieve constipation take stool softeners if advised   · Increase fiber in your diet    Most importantly ENJOY your Baby!  - Dr. Kaitlyn Arvizu =)))    Please call immediately with Questions and Concerns - [phone] (Office) or [phone] (Answering Service) after hours and weekends. By signing below, I understand that if any emergent problems occur, once I leave the hospital, I am to dial #[phone] or go immediately to the nearest Emergency Room. For less emergent matters,  Dr. Freedom Maria can be reached by calling his Office or  answering service at the above numbers. I understand and acknowledge receipt of the instructions indicated above.

## 2021-04-27 NOTE — DISCHARGE SUMMARY
affecting pregnancy in third trimester    History of marijuana use- using weekly    Anxiety during pregnancy- Stopped Bupsar once found out pregnant    ASCUS with positive high risk HPV cervical 10/15/2020 antepartum Pap smear - Had Midtrimester colposcopy with Dr Isha Uribe - needs PP colposcopy    40 1/7 weeks gestation of pregnancy      Post-delivery Diagnosis:  Living  infant Female      Patient Active Problem List   Diagnosis    Primigravida, third trimester- transfer from [facility] at 27 weeks.  Obesity affecting pregnancy in third trimester    History of marijuana use- using weekly    Anxiety during pregnancy- Stopped Bupsar once found out pregnant    ASCUS with positive high risk HPV cervical 10/15/2020 antepartum Pap smear - Had Midtrimester colposcopy with Dr Isha Uribe - rupesh PP colposcopy    40 1/7 weeks gestation of pregnancy     (normal spontaneous vaginal delivery)    Term birth of  female   [employer] Obstetric vaginal laceration (at hymen 5 and 7:00) without perineal laceration    Obstetric labial laceration (b/l labia minora), delivered, current hospitalization      Information for the patient's :  Faby Silvestre [[id number]]   Spontaneous Vaginal Delivery -Uncomplicated  Delivering Clinician: Radha Lepe Wt:   Information for the patient's :  Faby Silvestre [[id number]]   7 pounds 14 ounces  3560 g     APGARS:         Information for the patient's :  Faby Silvestre [[id number]]   1 minute: 8  5 minute: 9     Anesthesia:  epidural anesthesia     Application and Delivery:  31 y.o.  W female  at 44w3d admitted for postdates labor induction with Cytotec (4 doses) who progressed to complete post SROM and Pitocin augmentation and delivered Cephalic, left occiput anterior presentation via  @ 2244, under epidural anesthesia anesthesia,  over an intact perineum with resulting vaginal (5 and 7:00 at the hymen) and bilateral labia minora lacerations, without dystocia or complication, a Live Born Female infant, weighing 7# 14oz, 3560 grams, Clear fluid at delivery, bulb suctioned on perineum. A nuchal cord was not present. Spontaneous cry,  Apgar's 1 minute:  8; 5 minute:  9. The placenta was delivered with gentle traction and was noted to be intact, whole and that the umbilical cord had three vessels noted. Episiotomy was not needed due to a spontaneous vaginal lacerations at the hymen (4 and 7:00) and bilateral labia minora. Repair performed figure-of-eight stitches at the hymen and simple interrupted stitches of the labia minora, per routine with  Vicryl 3-0 suture. Cervix, rectum, sphincter intact.  Sponge, needle, and instrument counts correct x 2.     Delivery Summary:  Mother's Information    Labor Events     labor?: No  Rupture type: Spontaneous=SROM  Fluid color: Clear  Fluid odor: None  Augmentation: Oxytocin      Mother Delivery Information    Episiotomy: None  Lacerations: None  # of Repair Packets: 2  Vaginal Delivery Est. Blood Loss (mL): 200  Surgical or Additional Est. Blood Loss (mL): 0 (View Only): Edit in Flowsheets   Combined Est. Blood Loss (mL): 200      Withalicew, Baby Girl Nirmala [[id number]]    Labor Events     labor?: No   steroids?: None  Cervical ripening date/time: 21 23:25:00   Cervical ripening type: Misoprostol  Antibiotics received during labor?: No       Rupture Identifier: Sac 1   Rupture date/time: 21 15:35:00   Rupture type: Spontaneous=SROM  Fluid color: Clear  Fluid odor: None  Induction: Cervidil  Indications for induction: Post-term Gestation  Augmentation: Oxytocin  Indications for augmentation: Ineffective Contraction Pattern  Labor complications: None       Labor Event Times    Labor onset date/time: 21 1530   Dilation complete date/time:  21 EDT   Start pushin2021       Anesthesia    Method: Epidural      Assisted Delivery Details    Forceps attempted?: No  Vacuum extractor attempted?: No      Shoulder Dystocia    Shoulder dystocia present?: No      [place] Presentation    Presentation: Vertex  Position: Right  _: Occiput  _: Anterior       Information    Head delivery date/time: 2021 22:44:00         Changing the 's delivery date/time could affect patient care.:    Delivery date/time:  21   Delivery type: Vaginal, Spontaneous      Delivery Providers    Delivering clinician: Alka Noriega MD    Provider Role     Sadi Nieves RN Delivery Nurse     Ritu Landaverde RN Delivery Nurse     Lupe Arce RN Registered Nurse       Cord    Vessels: 3 Vessels  Complications: None  Delayed cord clamping?: Yes  Cord clamped date/time: 2021  Cord blood disposition: Lab  Gases sent?: Yes  Stem cell collection (by provider): No      Placenta    Date/time: 2021 22:50:00  Removal: Spontaneous  Appearance: Intact  Disposition: Refrigerator      Delivery Resuscitation    Method: Bulb Suction, Stimulation      Apgars    Living status: Living  Apgars    1 Minute:  5 Minute:  10 Minute 15 Minute 20 Minute   Skin Color: 0  1          Heart Rate: 2  2          Reflex Irritability: 2  2          Muscle Tone: 2  2          Respiratory Effort: 2  2          Total: 8  9                        Apgars Assigned By: Merrick Arrow       Skin to Skin    Skin to skin initiation date/time: 21 22:45:00   Skin to skin with: Mother        Skin to skin end date/time:            Measurements    Weight: 3560 g Length: 52.1 cm   Head circumference: 35 cm         Delivery Information    Episiotomy: None  Perineal lacerations: None     Labial laceration: bilateral Repaired: Yes   Vaginal laceration:  Yes     Cervical laceration: No     Vaginal delivery est. blood loss (mL): 200  Surgical or additional est. blood loss (mL): 0 (View Only): Edit in Flowsheets   Combined est. blood loss (mL): 200      Vaginal Delivery Counts    Initial count personnel: Angelita Landeros  Initial count verified by: Marleny Moses    4x4:  Needles:  Instruments:  Lap Pads:  Sponges:    Initial counts:              Final counts:              Final count personnel: John Olson  Final count verified by: CONNOR  Accurate final count?: Yes  Final vaginal sweep completed: Yes      Other Procedures    Procedures: None      Labor Length    1st stage: 5h 44m  2nd stage: 1h 29m  3rd stage: 0h 06m     Specimen:  None. Estimated blood loss: EBL (mL): 200(Filed from Delivery Summary)        Condition:  infant stable to general nursery and mother stable to maternity     Blood Type and Rh: A POS     Rubella Immunity Status:   Immune           Infant Feeding:    Breast     Attending Attestation: I performed the procedure. The patient had an unremarkable Hospital Course. Her care was advanced per routine protocol. Her vital signs were stable, she remained afebrile, and her physical exam was unremarkable throughout her hospitalization. She was subsequently discharged home on the second Hospital Day as she was tolerating her diet, ambulating well, voiding without difficulty and had appropriate flatus with a bowel movement.     Recent Results (from the past 72 hour(s))   CBC    Collection Time: 04/24/21 11:00 PM   Result Value Ref Range    WBC 12.1 (H) 4.5 - 11.5 E9/L    RBC 3.95 3.50 - 5.50 E12/L    Hemoglobin 12.6 11.5 - 15.5 g/dL    Hematocrit 37.5 34.0 - 48.0 %    MCV 94.9 80.0 - 99.9 fL    MCH 31.9 26.0 - 35.0 pg    MCHC 33.6 32.0 - 34.5 %    RDW 13.6 11.5 - 15.0 fL    Platelets 241 471 - 069 E9/L    MPV 11.4 7.0 - 12.0 fL   TYPE AND SCREEN    Collection Time: 04/24/21 11:00 PM   Result Value Ref Range    ABO/Rh A POS     Antibody Screen NEG    Urine Drug Screen    Collection Time: 04/24/21 11:00 PM   Result Value Ref Range    Amphetamine Screen, Urine NOT DETECTED Negative <1000 ng/mL    Barbiturate Screen, Ur NOT DETECTED Negative < 200 ng/mL kris-glycerin (TUCKS) pad Place rectally as needed. Qty:  , Refills: 0      docusate sodium (COLACE, DULCOLAX) 100 MG CAPS Take 100 mg by mouth 2 times daily as needed for Constipation         CONTINUE these medications which have NOT CHANGED    Details   Prenatal Vit-Fe Fumarate-FA (PRENATAL VITAMIN) 27-0.8 MG TABS Take 1 tablet by mouth daily            Follow-Up Visit Plan: In 6 weeks for final postpartum visit.  Disposition: Home. Time Spent on Discharge:  30 minutes were spent in patient examination, evaluation, counseling as well as medication reconciliation, prescriptions for required medications, discharge plan and follow up.       Angela Bay MD,FACOG    4/27/2021 10:22 AM

## 2021-04-27 NOTE — PLAN OF CARE
Problem: Fluid Volume - Imbalance:  Goal: Absence of imbalanced fluid volume signs and symptoms  Description: Absence of imbalanced fluid volume signs and symptoms  Outcome: Met This Shift  Goal: Absence of intrapartum hemorrhage signs and symptoms  Description: Absence of intrapartum hemorrhage signs and symptoms  Outcome: Met This Shift  Goal: Absence of postpartum hemorrhage signs and symptoms  Description: Absence of postpartum hemorrhage signs and symptoms  Outcome: Met This Shift     Problem: Infection - Intrapartum Infection:  Goal: Will show no infection signs and symptoms  Description: Will show no infection signs and symptoms  Outcome: Met This Shift     Problem: Pain - Acute:  Goal: Pain level will decrease  Description: Pain level will decrease  Outcome: Met This Shift  Goal: Able to cope with pain  Description: Able to cope with pain  Outcome: Met This Shift     Problem: Sensory:  Goal: Pain level will decrease  Description: Pain level will decrease  Outcome: Met This Shift     Problem: Pain:  Goal: Pain level will decrease  Description: Pain level will decrease  Outcome: Met This Shift  Goal: Control of acute pain  Description: Control of acute pain  Outcome: Met This Shift  Goal: Control of chronic pain  Description: Control of chronic pain  Outcome: Met This Shift     Problem: Infection - Risk of, Puerperal Infection:  Goal: Will show no infection signs and symptoms  Description: Will show no infection signs and symptoms  Outcome: Met This Shift     Problem: Mood - Altered:  Goal: Mood stable  Description: Mood stable  Outcome: Met This Shift

## 2021-04-27 NOTE — PROGRESS NOTES
CLINICAL PHARMACY NOTE: MEDS TO [address] Select Patient?: No  Total # of Prescriptions Filled: 1   The following medications were delivered to the patient:  · ibu 600  Total # of Interventions Completed: 6  Time Spent (min): 45    Additional Documentation:

## 2021-05-13 NOTE — CARE COORDINATION
SW Discharge Planning     SW noted baby's cordstat to be negative for all substances tested     Electronically signed by THOMAS Mckinney on 5/13/2021 at 10:01 AM

## 2023-12-29 ENCOUNTER — ANCILLARY PROCEDURE (OUTPATIENT)
Dept: OBGYN CLINIC | Age: 28
End: 2023-12-29
Payer: COMMERCIAL

## 2023-12-29 ENCOUNTER — INITIAL PRENATAL (OUTPATIENT)
Dept: OBGYN CLINIC | Age: 28
End: 2023-12-29
Payer: COMMERCIAL

## 2023-12-29 VITALS
DIASTOLIC BLOOD PRESSURE: 81 MMHG | HEART RATE: 101 BPM | WEIGHT: 265.8 LBS | HEIGHT: 67 IN | SYSTOLIC BLOOD PRESSURE: 116 MMHG | BODY MASS INDEX: 41.72 KG/M2

## 2023-12-29 DIAGNOSIS — Z3A.15 15 WEEKS GESTATION OF PREGNANCY: Primary | ICD-10-CM
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
## 2023-12-29 DIAGNOSIS — Z34.90 SECOND PREGNANCY: ICD-10-CM

## 2023-12-29 LAB
GLUCOSE URINE, POC: NEGATIVE
PROTEIN UA: NEGATIVE

## 2023-12-29 PROCEDURE — 81002 URINALYSIS NONAUTO W/O SCOPE: CPT | Performed by: OBSTETRICS & GYNECOLOGY

## 2023-12-29 PROCEDURE — 99203 OFFICE O/P NEW LOW 30 MIN: CPT | Performed by: OBSTETRICS & GYNECOLOGY

## 2023-12-29 PROCEDURE — 76805 OB US >/= 14 WKS SNGL FETUS: CPT | Performed by: OBSTETRICS & GYNECOLOGY

## 2023-12-29 NOTE — PROGRESS NOTES
OB new Patient is here for genetic testing and X ray exposure in the first trimester. Referred by PCP Dr Mariza Fraga. Has 1st initial prenatal visit 1/4/23 Patient denies any vaginal bleeding, leakage of fluid or cramping. Patient has good fetal movement.

## 2023-12-29 NOTE — PROGRESS NOTES
[facility] FETAL MEDICINE  [address]  [facility] [address]  Dept: [phone]  Loc: [phone]     2023    RE:  Anita Mallory     : 1995   AGE: 33 y.o. Dear Dr. Ramya West,    Thank you for allowing me to provide prenatal consultation for Anita Mallory. As I'm sure you will recall, Anita Mallory is a 33 y.o. [id number]'s last menstrual period was 2023.  Estimated Date of Delivery: 24 at 15w4d seen in our office today for the following:    REASON FOR CONSULTATION:  Patient Active Problem List    Diagnosis Date Noted    Second pregnancy 2023    Exposure to x-rays 2023    15 weeks gestation of pregnancy 2023    History of marijuana use- using weekly 2021    ASCUS with positive high risk HPV cervical 10/15/2020 antepartum Pap smear - Had Midtrimester colposcopy with Dr Ramya West - needs PP colposcopy 2021        PAST HISTORY:  OB History    Para Term  AB Living   2 1 1     1   SAB IAB Ectopic Molar Multiple Live Births           0 1      # Outcome Date GA Lbr Ezequiel/2nd Weight Sex Delivery Anes PTL Lv   2 Current            1 Term 21 40w1d 05:44 / 01:29 3.56 kg (7 lb 13.6 oz) F Vag-Spont EPI N VICKY          MEDICAL:  Past Medical History:   Diagnosis Date    Anxiety during pregnancy- Stopped Bupsar once found out pregnant 2021    ASCUS with positive high risk HPV cervical 10/15/2020 antepartum Pap smear - Had Mddtrimester colposcopy with Dr Ramya West - needs PP colposcopy 2021    Obstetric vaginal laceration (at hymen 5 and 7:00) without perineal laceration 2021        SURGICAL:  Past Surgical History:   Procedure Laterality Date    LYMPH NODE BIOPSY      TONSILLECTOMY AND ADENOIDECTOMY         ALLERGIES:    No Known Allergies      MEDICATIONS:    Current Outpatient Medications   Medication Sig Dispense Refill    ibuprofen (ADVIL;MOTRIN)

## 2024-02-02 ENCOUNTER — ROUTINE PRENATAL (OUTPATIENT)
Dept: OBGYN CLINIC | Age: 29
End: 2024-02-02
Payer: COMMERCIAL

## 2024-02-02 ENCOUNTER — ANCILLARY PROCEDURE (OUTPATIENT)
Dept: OBGYN CLINIC | Age: 29
End: 2024-02-02
Payer: COMMERCIAL

## 2024-02-02 VITALS
WEIGHT: 267.2 LBS | HEART RATE: 123 BPM | SYSTOLIC BLOOD PRESSURE: 114 MMHG | DIASTOLIC BLOOD PRESSURE: 74 MMHG | BODY MASS INDEX: 41.85 KG/M2

## 2024-02-02 DIAGNOSIS — Z34.90 SECOND PREGNANCY: ICD-10-CM

## 2024-02-02 DIAGNOSIS — Z3A.20 20 WEEKS GESTATION OF PREGNANCY: Primary | ICD-10-CM

## 2024-02-02 DIAGNOSIS — O99.213 OBESITY AFFECTING PREGNANCY IN THIRD TRIMESTER, UNSPECIFIED OBESITY TYPE: ICD-10-CM

## 2024-02-02 LAB
GLUCOSE URINE, POC: NEGATIVE
PROTEIN UA: NEGATIVE

## 2024-02-02 PROCEDURE — 99999 PR OFFICE/OUTPT VISIT,PROCEDURE ONLY: CPT | Performed by: OBSTETRICS & GYNECOLOGY

## 2024-02-02 PROCEDURE — 76817 TRANSVAGINAL US OBSTETRIC: CPT | Performed by: OBSTETRICS & GYNECOLOGY

## 2024-02-02 PROCEDURE — 76811 OB US DETAILED SNGL FETUS: CPT | Performed by: OBSTETRICS & GYNECOLOGY

## 2024-02-02 PROCEDURE — 81002 URINALYSIS NONAUTO W/O SCOPE: CPT | Performed by: OBSTETRICS & GYNECOLOGY

## 2024-02-02 PROCEDURE — 99213 OFFICE O/P EST LOW 20 MIN: CPT | Performed by: OBSTETRICS & GYNECOLOGY

## 2024-02-02 RX ORDER — DIPHENHYDRAMINE HYDROCHLORIDE 25 MG/1
25 CAPSULE ORAL EVERY 8 HOURS
COMMUNITY

## 2024-02-02 RX ORDER — LAMOTRIGINE 25 MG/1
50 TABLET ORAL DAILY
COMMUNITY
Start: 2023-02-01

## 2024-02-02 RX ORDER — ALBUTEROL SULFATE 90 UG/1
2 AEROSOL, METERED RESPIRATORY (INHALATION) EVERY 6 HOURS PRN
COMMUNITY
Start: 2023-03-16

## 2024-02-02 NOTE — PROGRESS NOTES
Pt here for anatomy scan.  Pt has some occasional cramping, denies bleeding/lof.  Pt states good fetal movement.

## 2024-02-02 NOTE — PROGRESS NOTES
[facility] MATERNAL FETAL MEDICINE  [address] [place] [address]  Dept: [phone]  Loc: [phone]     2024    RE:  Nirmala Valdez     : 1995   AGE: 28 y.o.     Dear Dr. Garcia,    Thank you for allowing me to see Nirmala Valdez.  As I'm sure you will recall, Nirmala Valdez is a 28 y.o. [id number]'s last menstrual period was 2023. Estimated Date of Delivery: 24 at 20w4d seen in our office today for the following:    REASON FOR VISIT: Level II    Patient Active Problem List    Diagnosis Date Noted    Second pregnancy 2023    Exposure to x-rays 2023    20 weeks gestation of pregnancy 2023    History of marijuana use- using weekly 2021    ASCUS with positive high risk HPV cervical 10/15/2020 antepartum Pap smear - Had Midtrimester colposcopy with Dr Greg goddard PP colposcopy 2021        PAST HISTORY:  OB History    Para Term  AB Living   2 1 1     1   SAB IAB Ectopic Molar Multiple Live Births           0 1      # Outcome Date GA Lbr Ezequiel/2nd Weight Sex Delivery Anes PTL Lv   2 Current            1 Term 21 40w1d 05:44 / 01:29 3.56 kg (7 lb 13.6 oz) F Vag-Spont EPI N VICKY          MEDICAL:  Past Medical History:   Diagnosis Date    Anxiety during pregnancy- Stopped Bupsar once found out pregnant 2021    ASCUS with positive high risk HPV cervical 10/15/2020 antepartum Pap smear - Had Mddtrimester colposcopy with Dr Greg goddard PP colposcopy 2021    Obstetric vaginal laceration (at hymen 5 and 7:00) without perineal laceration 2021        SURGICAL:  Past Surgical History:   Procedure Laterality Date    LYMPH NODE BIOPSY      TONSILLECTOMY AND ADENOIDECTOMY         ALLERGIES:    No Known Allergies      MEDICATIONS:    Current Outpatient Medications   Medication Sig Dispense Refill    Prenatal Vit-Fe Fumarate-FA (PRENATAL VITAMIN)

## 2024-05-30 PROBLEM — O99.212 OBESITY AFFECTING PREGNANCY IN SECOND TRIMESTER: Status: ACTIVE | Noted: 2021-04-23

## 2024-05-30 PROBLEM — Z34.83 MULTIGRAVIDA IN THIRD TRIMESTER: Status: ACTIVE | Noted: 2024-05-30

## 2024-06-12 ENCOUNTER — APPOINTMENT (OUTPATIENT)
Dept: LABOR AND DELIVERY | Age: 29
End: 2024-06-12
Payer: COMMERCIAL

## 2024-06-12 ENCOUNTER — HOSPITAL ENCOUNTER (INPATIENT)
Age: 29
LOS: 2 days | Discharge: HOME | End: 2024-06-14
Attending: OBSTETRICS & GYNECOLOGY | Admitting: OBSTETRICS & GYNECOLOGY
Payer: COMMERCIAL

## 2024-06-12 PROBLEM — Z3A.39 39 WEEKS GESTATION OF PREGNANCY: Status: ACTIVE | Noted: 2024-06-12

## 2024-06-12 LAB
AMPHET UR QL SCN: NEGATIVE
BARBITURATES UR QL SCN: NEGATIVE
BASOPHILS # BLD: 0.04 K/UL (ref 0–0.2)
BASOPHILS NFR BLD: 0 % (ref 0–2)
BENZODIAZ UR QL: NEGATIVE
BUPRENORPHINE UR QL: NEGATIVE
CANNABINOIDS UR QL SCN: NEGATIVE
COCAINE UR QL SCN: NEGATIVE
EOSINOPHIL # BLD: 0.21 K/UL (ref 0.05–0.5)
EOSINOPHILS RELATIVE PERCENT: 2 % (ref 0–6)
ERYTHROCYTE [DISTWIDTH] IN BLOOD BY AUTOMATED COUNT: 13.1 % (ref 11.5–15)
FENTANYL UR QL: NEGATIVE
HCT VFR BLD AUTO: 39.4 % (ref 34–48)
HGB BLD-MCNC: 13.3 G/DL (ref 11.5–15.5)
IMM GRANULOCYTES # BLD AUTO: 0.05 K/UL (ref 0–0.58)
IMM GRANULOCYTES NFR BLD: 0 % (ref 0–5)
LYMPHOCYTES NFR BLD: 2.56 K/UL (ref 1.5–4)
LYMPHOCYTES RELATIVE PERCENT: 20 % (ref 20–42)
MCH RBC QN AUTO: 31.9 PG (ref 26–35)
MCHC RBC AUTO-ENTMCNC: 33.8 G/DL (ref 32–34.5)
MCV RBC AUTO: 94.5 FL (ref 80–99.9)
METHADONE UR QL: NEGATIVE
MONOCYTES NFR BLD: 0.88 K/UL (ref 0.1–0.95)
MONOCYTES NFR BLD: 7 % (ref 2–12)
NEUTROPHILS NFR BLD: 70 % (ref 43–80)
NEUTS SEG NFR BLD: 8.83 K/UL (ref 1.8–7.3)
OPIATES UR QL SCN: NEGATIVE
OXYCODONE UR QL SCN: NEGATIVE
PCP UR QL SCN: NEGATIVE
PLATELET # BLD AUTO: 140 K/UL (ref 130–450)
PMV BLD AUTO: 12.3 FL (ref 7–12)
RBC # BLD AUTO: 4.17 M/UL (ref 3.5–5.5)
TEST INFORMATION: NORMAL
WBC OTHER # BLD: 12.6 K/UL (ref 4.5–11.5)

## 2024-06-12 PROCEDURE — 85025 COMPLETE CBC W/AUTO DIFF WBC: CPT

## 2024-06-12 PROCEDURE — 99221 1ST HOSP IP/OBS SF/LOW 40: CPT

## 2024-06-12 PROCEDURE — 1220000001 HC SEMI PRIVATE L&D R&B

## 2024-06-12 PROCEDURE — 6370000000 HC RX 637 (ALT 250 FOR IP): Performed by: OBSTETRICS & GYNECOLOGY

## 2024-06-12 PROCEDURE — 86850 RBC ANTIBODY SCREEN: CPT

## 2024-06-12 PROCEDURE — 80307 DRUG TEST PRSMV CHEM ANLYZR: CPT

## 2024-06-12 PROCEDURE — 86900 BLOOD TYPING SEROLOGIC ABO: CPT

## 2024-06-12 PROCEDURE — 86901 BLOOD TYPING SEROLOGIC RH(D): CPT

## 2024-06-12 RX ORDER — SODIUM CHLORIDE, SODIUM LACTATE, POTASSIUM CHLORIDE, CALCIUM CHLORIDE 600; 310; 30; 20 MG/100ML; MG/100ML; MG/100ML; MG/100ML
INJECTION, SOLUTION INTRAVENOUS CONTINUOUS
Status: DISCONTINUED | OUTPATIENT
Start: 2024-06-12 | End: 2024-06-13

## 2024-06-12 RX ORDER — SODIUM CHLORIDE, SODIUM LACTATE, POTASSIUM CHLORIDE, AND CALCIUM CHLORIDE .6; .31; .03; .02 G/100ML; G/100ML; G/100ML; G/100ML
500 INJECTION, SOLUTION INTRAVENOUS PRN
Status: DISCONTINUED | OUTPATIENT
Start: 2024-06-12 | End: 2024-06-13

## 2024-06-12 RX ORDER — METHYLERGONOVINE MALEATE 0.2 MG/ML
200 INJECTION INTRAVENOUS PRN
Status: DISCONTINUED | OUTPATIENT
Start: 2024-06-12 | End: 2024-06-13

## 2024-06-12 RX ORDER — TERBUTALINE SULFATE 1 MG/ML
0.25 INJECTION, SOLUTION SUBCUTANEOUS
Status: DISCONTINUED | OUTPATIENT
Start: 2024-06-12 | End: 2024-06-13

## 2024-06-12 RX ORDER — TRANEXAMIC ACID 10 MG/ML
1000 INJECTION, SOLUTION INTRAVENOUS
Status: DISCONTINUED | OUTPATIENT
Start: 2024-06-12 | End: 2024-06-13

## 2024-06-12 RX ORDER — ONDANSETRON 2 MG/ML
4 INJECTION INTRAMUSCULAR; INTRAVENOUS EVERY 6 HOURS PRN
Status: DISCONTINUED | OUTPATIENT
Start: 2024-06-12 | End: 2024-06-13

## 2024-06-12 RX ORDER — CARBOPROST TROMETHAMINE 250 UG/ML
250 INJECTION, SOLUTION INTRAMUSCULAR PRN
Status: DISCONTINUED | OUTPATIENT
Start: 2024-06-12 | End: 2024-06-13

## 2024-06-12 RX ORDER — ONDANSETRON 4 MG/1
4 TABLET, ORALLY DISINTEGRATING ORAL EVERY 6 HOURS PRN
Status: DISCONTINUED | OUTPATIENT
Start: 2024-06-12 | End: 2024-06-13

## 2024-06-12 RX ORDER — MISOPROSTOL 200 UG/1
400 TABLET ORAL PRN
Status: DISCONTINUED | OUTPATIENT
Start: 2024-06-12 | End: 2024-06-13

## 2024-06-12 RX ADMIN — Medication 25 MCG: at 23:23

## 2024-06-13 ENCOUNTER — ANESTHESIA (OUTPATIENT)
Dept: LABOR AND DELIVERY | Age: 29
End: 2024-06-13
Payer: COMMERCIAL

## 2024-06-13 ENCOUNTER — ANESTHESIA EVENT (OUTPATIENT)
Dept: LABOR AND DELIVERY | Age: 29
End: 2024-06-13
Payer: COMMERCIAL

## 2024-06-13 PROBLEM — F12.91 HISTORY OF MARIJUANA USE: Status: RESOLVED | Noted: 2021-04-23 | Resolved: 2024-06-13
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
## 2024-06-13 PROBLEM — Z3A.20 20 WEEKS GESTATION OF PREGNANCY: Status: RESOLVED | Noted: 2023-12-29 | Resolved: 2024-06-13

## 2024-06-13 PROBLEM — R87.610 ASCUS WITH POSITIVE HIGH RISK HPV CERVICAL: Status: RESOLVED | Noted: 2021-04-23 | Resolved: 2024-06-13

## 2024-06-13 PROBLEM — Z34.90 ENCOUNTER FOR PLANNED INDUCTION OF LABOR: Status: ACTIVE | Noted: 2024-06-12

## 2024-06-13 PROBLEM — R87.810 ASCUS WITH POSITIVE HIGH RISK HPV CERVICAL: Status: RESOLVED | Noted: 2021-04-23 | Resolved: 2024-06-13

## 2024-06-13 LAB
ABO + RH BLD: NORMAL
ARM BAND NUMBER: NORMAL
BLOOD BANK SAMPLE EXPIRATION: NORMAL
BLOOD GROUP ANTIBODIES SERPL: NEGATIVE

## 2024-06-13 PROCEDURE — 3E0P7VZ INTRODUCTION OF HORMONE INTO FEMALE REPRODUCTIVE, VIA NATURAL OR ARTIFICIAL OPENING: ICD-10-PCS | Performed by: OBSTETRICS & GYNECOLOGY

## 2024-06-13 PROCEDURE — 2500000003 HC RX 250 WO HCPCS: Performed by: ANESTHESIOLOGY

## 2024-06-13 PROCEDURE — 1220000000 HC SEMI PRIVATE OB R&B

## 2024-06-13 PROCEDURE — 3700000025 EPIDURAL BLOCK: Performed by: ANESTHESIOLOGY

## 2024-06-13 PROCEDURE — 6370000000 HC RX 637 (ALT 250 FOR IP): Performed by: OBSTETRICS & GYNECOLOGY

## 2024-06-13 PROCEDURE — 6360000002 HC RX W HCPCS: Performed by: OBSTETRICS & GYNECOLOGY

## 2024-06-13 PROCEDURE — 51701 INSERT BLADDER CATHETER: CPT

## 2024-06-13 PROCEDURE — 7200000001 HC VAGINAL DELIVERY

## 2024-06-13 PROCEDURE — 10907ZC DRAINAGE OF AMNIOTIC FLUID, THERAPEUTIC FROM PRODUCTS OF CONCEPTION, VIA NATURAL OR ARTIFICIAL OPENING: ICD-10-PCS | Performed by: OBSTETRICS & GYNECOLOGY

## 2024-06-13 RX ORDER — NALOXONE HYDROCHLORIDE 0.4 MG/ML
INJECTION, SOLUTION INTRAMUSCULAR; INTRAVENOUS; SUBCUTANEOUS PRN
Status: DISCONTINUED | OUTPATIENT
Start: 2024-06-13 | End: 2024-06-13

## 2024-06-13 RX ORDER — IBUPROFEN 600 MG/1
600 TABLET ORAL EVERY 6 HOURS PRN
Status: DISCONTINUED | OUTPATIENT
Start: 2024-06-13 | End: 2024-06-14 | Stop reason: HOSPADM

## 2024-06-13 RX ORDER — MODIFIED LANOLIN
OINTMENT (GRAM) TOPICAL PRN
Status: DISCONTINUED | OUTPATIENT
Start: 2024-06-13 | End: 2024-06-14 | Stop reason: HOSPADM

## 2024-06-13 RX ORDER — ONDANSETRON 2 MG/ML
4 INJECTION INTRAMUSCULAR; INTRAVENOUS EVERY 6 HOURS PRN
Status: DISCONTINUED | OUTPATIENT
Start: 2024-06-13 | End: 2024-06-13

## 2024-06-13 RX ORDER — ACETAMINOPHEN 650 MG
TABLET, EXTENDED RELEASE ORAL
Status: COMPLETED
Start: 2024-06-13 | End: 2024-06-13

## 2024-06-13 RX ORDER — PRENATAL WITH FERROUS FUM AND FOLIC ACID 3080; 920; 120; 400; 22; 1.84; 3; 20; 10; 1; 12; 200; 27; 25; 2 [IU]/1; [IU]/1; MG/1; [IU]/1; MG/1; MG/1; MG/1; MG/1; MG/1; MG/1; UG/1; MG/1; MG/1; MG/1; MG/1
1 TABLET ORAL
Status: DISCONTINUED | OUTPATIENT
Start: 2024-06-14 | End: 2024-06-14 | Stop reason: HOSPADM

## 2024-06-13 RX ORDER — LIDOCAINE HYDROCHLORIDE 10 MG/ML
INJECTION, SOLUTION INFILTRATION; PERINEURAL
Status: DISCONTINUED
Start: 2024-06-13 | End: 2024-06-13 | Stop reason: WASHOUT

## 2024-06-13 RX ORDER — ACETAMINOPHEN 500 MG
1000 TABLET ORAL EVERY 6 HOURS PRN
Status: DISCONTINUED | OUTPATIENT
Start: 2024-06-13 | End: 2024-06-14 | Stop reason: HOSPADM

## 2024-06-13 RX ORDER — DOCUSATE SODIUM 100 MG/1
100 CAPSULE, LIQUID FILLED ORAL 2 TIMES DAILY
Status: DISCONTINUED | OUTPATIENT
Start: 2024-06-13 | End: 2024-06-14 | Stop reason: HOSPADM

## 2024-06-13 RX ADMIN — ACETAMINOPHEN 1000 MG: 500 TABLET ORAL at 22:03

## 2024-06-13 RX ADMIN — Medication 15 ML/HR: at 08:45

## 2024-06-13 RX ADMIN — Medication 87.3 MILLI-UNITS/MIN: at 13:38

## 2024-06-13 RX ADMIN — ACETAMINOPHEN 1000 MG: 500 TABLET ORAL at 14:28

## 2024-06-13 RX ADMIN — IBUPROFEN 600 MG: 600 TABLET, FILM COATED ORAL at 13:50

## 2024-06-13 RX ADMIN — Medication: at 11:53

## 2024-06-13 RX ADMIN — DOCUSATE SODIUM 100 MG: 100 CAPSULE, LIQUID FILLED ORAL at 13:50

## 2024-06-13 RX ADMIN — Medication 10 ML: at 08:44

## 2024-06-13 RX ADMIN — IBUPROFEN 600 MG: 600 TABLET, FILM COATED ORAL at 20:08

## 2024-06-13 RX ADMIN — DOCUSATE SODIUM 100 MG: 100 CAPSULE, LIQUID FILLED ORAL at 20:09

## 2024-06-13 RX ADMIN — Medication 25 MCG: at 03:40

## 2024-06-13 RX ADMIN — Medication 166.7 ML: at 12:16

## 2024-06-13 ASSESSMENT — PAIN DESCRIPTION - ORIENTATION
ORIENTATION: LOWER
ORIENTATION: LOWER

## 2024-06-13 ASSESSMENT — PAIN SCALES - GENERAL
PAINLEVEL_OUTOF10: 3
PAINLEVEL_OUTOF10: 4
PAINLEVEL_OUTOF10: 5
PAINLEVEL_OUTOF10: 4

## 2024-06-13 ASSESSMENT — PAIN DESCRIPTION - LOCATION
LOCATION: PERINEUM
LOCATION: ABDOMEN;PERINEUM
LOCATION: PERINEUM
LOCATION: ABDOMEN

## 2024-06-13 ASSESSMENT — PAIN DESCRIPTION - DESCRIPTORS
DESCRIPTORS: DISCOMFORT;ACHING;BURNING
DESCRIPTORS: ACHING;DISCOMFORT
DESCRIPTORS: ACHING;DISCOMFORT;CRAMPING
DESCRIPTORS: ACHING;CRAMPING;DISCOMFORT

## 2024-06-13 ASSESSMENT — PAIN - FUNCTIONAL ASSESSMENT
PAIN_FUNCTIONAL_ASSESSMENT: ACTIVITIES ARE NOT PREVENTED
PAIN_FUNCTIONAL_ASSESSMENT: ACTIVITIES ARE NOT PREVENTED

## 2024-06-13 ASSESSMENT — LIFESTYLE VARIABLES: SMOKING_STATUS: 0

## 2024-06-13 NOTE — ANESTHESIA PROCEDURE NOTES
Epidural Block    Patient location during procedure: OB  Reason for block: labor epidural  Staffing  Performed: other anesthesia staff   Resident/CRNA: Tera Jo APRN - CRNA  Other anesthesia staff: Quan Merrill RN  Performed by: Tera Jo APRN - CRNA  Authorized by: Zia Rothman MD    Epidural  Patient position: sitting  Prep: ChloraPrep  Patient monitoring: continuous pulse ox and frequent blood pressure checks  Approach: midline  Location: L3-4  Injection technique: ANSON air  Provider prep: mask and sterile gloves  Needle  Needle type: Tuohy   Needle gauge: 17 G  Needle length: 3.5 in  Needle insertion depth: 4.5 cm  Catheter type: end hole  Catheter size: 20 G (20 G)  Catheter at skin depth: 12 cm  Test dose: negativeCatheter Secured: tegaderm and tape  Assessment  Hemodynamics: stable  Attempts: 1  Outcomes: uncomplicated and patient tolerated procedure well  Preanesthetic Checklist  Completed: patient identified, IV checked, site marked, risks and benefits discussed, surgical/procedural consents, equipment checked, pre-op evaluation, timeout performed, anesthesia consent given, oxygen available and monitors applied/VS acknowledged

## 2024-06-13 NOTE — PROGRESS NOTES
Updated Dr. Garcia that patient is comfortable with an epidural. SVE 4-5/90/-1. Contractions every 5 minutes. Category 1 tracing.

## 2024-06-13 NOTE — ANESTHESIA PRE PROCEDURE
Department of Anesthesiology  Preprocedure Note       Name:  Nirmala Valdez   Age:  29 y.o.  :  1995                                          MRN:  [id number]         Date:  2024      Surgeon: * No surgeons listed *    Procedure: * No procedures listed *    Medications prior to admission:   Prior to Admission medications    Medication Sig Start Date End Date Taking? Authorizing Provider   Prenatal Vit-Fe Fumarate-FA (PRENATAL VITAMIN) 27-0.8 MG TABS Take 1 tablet by mouth daily 3/28/24   Omar Garcia MD   doxyLAMINE succinate (GNP SLEEP AID) 25 MG tablet take one tab by mouth at bedtime, if no relief in 48 hours add 1/2 tab in am, if no relief in 48 hours add 1/2 tab at noon  Patient not taking: Reported on 2024 10/26/23   Erickson Can MD   vitamin B-6 (PYRIDOXINE) 25 MG tablet Take 1 tablet by mouth every 8 (eight) hours  Patient not taking: Reported on 2024    Erickson Can MD       Current medications:    Current Facility-Administered Medications   Medication Dose Route Frequency Provider Last Rate Last Admin    naloxone 0.4 mg in 10 mL sodium chloride syringe   IntraVENous PRN Zia Rothman MD        ondansetron (ZOFRAN) injection 4 mg  4 mg IntraVENous Q6H PRN Zia Rothman MD        fentaNYL 1.85mcg/ml and Bupivicaine 0.1% in 0.9% NS 135ml infusion (OB) epidural  15 mL/hr Epidural Continuous Zia Rothman MD        lactated ringers IV soln infusion   IntraVENous Continuous Omar Garcia MD        lactated ringers bolus 500 mL  500 mL IntraVENous PRN Omar Garcia MD        Or    lactated ringers bolus 500 mL  500 mL IntraVENous PRN Omar Garcia MD        methylergonovine (METHERGINE) injection 200 mcg  200 mcg IntraMUSCular PRN Omar Garcia MD        carboprost (HEMABATE) injection 250 mcg  250 mcg IntraMUSCular PRN Omar Garcia MD        miSOPROStol (CYTOTEC) tablet 400 mcg  400 mcg Buccal PRN Radha

## 2024-06-13 NOTE — PROGRESS NOTES
Dr. Garcia updated that patient is uncomfortable, kaye every 3-4 minutes. SVE 4/90/-1. Orders received for an epidural. Call with update after patient is comfortable.

## 2024-06-13 NOTE — PROGRESS NOTES
Updated Dr. Garcia that patient is 7/90/0, and starting to feel some pressure. Update when needed for delivery.

## 2024-06-13 NOTE — PROGRESS NOTES
Patient admitted to room 307. Oriented to room and floor. Admission book, PPD, Reta paper and safe sleep all given and reviewed with patient. Admission as charted. Patient refusing Tdap vaccine at this time. Call light within reach.

## 2024-06-13 NOTE — PROGRESS NOTES
Patient actively pushing. RN remains in continuous attendance at the bedside. Assessment & evaluation of fetal rate ongoing via continuous efmRN remained at bedside throughout pushing. Efm continuously assessed. Vaginal delivery of viable infant.

## 2024-06-13 NOTE — H&P
Department of Obstetrics and Gynecology  Labor and Delivery  History & Physical    Patient:  Nirmala Valdez     Admit Date:  2024 10:01 PM  Medical Record Number:  [id number]    CHIEF COMPLAINT:  IOL    PROBLEM LIST:     Patient Active Problem List   Diagnosis    Obesity affecting pregnancy in third trimester    History of marijuana use- using weekly    ASCUS with positive high risk HPV cervical 10/15/2020 antepartum Pap smear - Had Midtrimester colposcopy with Dr Greg goddard PP colposcopy    Second pregnancy    Exposure to x-rays    20 weeks gestation of pregnancy    BMI 40.0-44.9, adult (HCC)    Multigravida in third trimester 16 week transfer from [facility] at 16w    39 weeks gestation of pregnancy           HISTORY OF PRESENT ILLNESS:    The patient is a 29 y.o. female  at 39w2d.  Patient presents with a chief complaint as above and is being admitted for   IOL at term. +FM. Denies any VB, LOF, ctxs or pain.     ESTIMATED DUE DATE: Estimated Date of Delivery: 24    PRENATAL CARE:  Complicated by: obesity, Hx marijuana use in pregnancy  GBS: negative    Past OB History  OB History          2    Para   1    Term   1            AB        Living   1         SAB        IAB        Ectopic        Molar        Multiple   0    Live Births   1                Past Medical History:        Diagnosis Date    Anxiety during pregnancy- Stopped Bupsar once found out pregnant 2021    ASCUS with positive high risk HPV cervical 10/15/2020 antepartum Pap smear - Had Mddtrimester colposcopy with Dr Greg goddard PP colposcopy 2021    Obstetric vaginal laceration (at hymen 5 and 7:00) without perineal laceration 2021       Past Surgical History:        Procedure Laterality Date    LYMPH NODE BIOPSY      TONSILLECTOMY AND ADENOIDECTOMY         Allergies:  Patient has no known allergies.    Social History:    Social History     Socioeconomic History    Marital status: Single     Spouse name:  HPV cervical 10/15/2020 antepartum Pap smear - Had Midtrimester colposcopy with Dr Garza - needs PP colposcopy    Second pregnancy    Exposure to x-rays    20 weeks gestation of pregnancy    BMI 40.0-44.9, adult (HCC)    Multigravida in third trimester 16 week transfer from [facility] at 16w    39 weeks gestation of pregnancy     Fetus: Reassuring  GBS: negative    PLAN:  RN received orders from VLADIMIR Berg - ROSHAN  6/12/2024 11:54 PM

## 2024-06-13 NOTE — LACTATION NOTE
Second time mom with little breastfeeding history.  Mom wants to at least offer colostrum for the first few days.  Attempted a feeding on the left breast but baby wasn't hungry.  Taught mom hand expression. Discussed frequency and duration of breastfeeds and signs of adequate milk transfer. Encouraged mom to hand express drops of colostrum at the start of a  breastfeed.  Recommended to avoid a pacifier for three weeks or until breastfeeding is well established.  Mom has an electric breast pump for home.  Went over breastfeeding resources.  Encouraged mom to call us with questions or concerns or for assistance.

## 2024-06-13 NOTE — L&D DELIVERY NOTE
Placenta Refrigerator    Lacerations    Episiotomy: None  Perineal Lacerations: None  Other Lacerations: no non-perineal laceration       Vaginal Counts    Initial Count Personnel: DMITRI  Initial Count Verified By: DR GARCIA  Intial Sponge Count: Correct Intial Needles Count: Correct Intial Instruments Count: Correct   Final Sponges Count: Correct Final Needles  Count: Correct Final Instruments Count: Correct   Final Count Personnel: DR. GARCIA  Final Count Verified By: DMITRI  Accurate Final Count?: Yes    Blood Loss  Mother: Nirmala Valdez #[id number]     Start of Mother's Information      Delivery Blood Loss  24 0913 - 24 1333      130 mL       End of Mother's Information  Mother: Nirmala Valdez #[id number]      Delivery Providers    Delivering clinician: Omar Garcia MD     Provider Role    Omar Garcia MD Obstetrician    Raven Rizo RN Primary Nurse    Soraida Plunkett RN Primary [place] Nurse    Zia Rothman MD Anesthesiologist    Tera Jo APRN - CRNA Nurse Anesthetist         [place] Assessment    Living Status: Living  Delivery Location Comment: 9        Skin Color:   Heart Rate:   Reflex Irritability:   Muscle Tone:   Respiratory Effort:   Total:            1 Minute:    1    2    2    2    2    9         5 Minute:    1    2    2    2    2    9                                        Apgars Assigned By: DMITRI         Resuscitation    Method: Stimulation, Bulb Suction      [place] Measurements      Birth Weight: 3660 g   Birth Length: 52.1 cm     Head Circumference: 35 cm         Skin to Skin      Skin to Skin Initiation Date/Time: 24 11:57:00 EDT     Skin to Skin With: Mother        Specimen:  None     Estimated blood loss: 130 mL     Condition:  infant stable to general nursery and mother stable to maternity    Blood Type and Rh: A POSITIVE    Rubella Immunity Status:   Immune           Infant Feeding:    breast and bottle    Attending Attestation: I

## 2024-06-14 VITALS
SYSTOLIC BLOOD PRESSURE: 114 MMHG | HEART RATE: 62 BPM | OXYGEN SATURATION: 97 % | RESPIRATION RATE: 16 BRPM | DIASTOLIC BLOOD PRESSURE: 62 MMHG | TEMPERATURE: 97.7 F

## 2024-06-14 PROBLEM — Z3A.39 39 WEEKS GESTATION OF PREGNANCY: Status: RESOLVED | Noted: 2024-06-12 | Resolved: 2024-06-14

## 2024-06-14 PROBLEM — Z34.90 ENCOUNTER FOR PLANNED INDUCTION OF LABOR: Status: RESOLVED | Noted: 2024-06-12 | Resolved: 2024-06-14

## 2024-06-14 PROBLEM — Z34.90 SECOND PREGNANCY: Status: RESOLVED | Noted: 2023-12-29 | Resolved: 2024-06-14

## 2024-06-14 PROBLEM — Z34.83 MULTIGRAVIDA IN THIRD TRIMESTER: Status: RESOLVED | Noted: 2024-05-30 | Resolved: 2024-06-14

## 2024-06-14 PROBLEM — O99.213 OBESITY AFFECTING PREGNANCY IN THIRD TRIMESTER: Status: RESOLVED | Noted: 2021-04-23 | Resolved: 2024-06-14

## 2024-06-14 PROCEDURE — 6370000000 HC RX 637 (ALT 250 FOR IP): Performed by: OBSTETRICS & GYNECOLOGY

## 2024-06-14 RX ORDER — ACETAMINOPHEN 500 MG
1000 TABLET ORAL EVERY 6 HOURS PRN
Refills: 0 | COMMUNITY
Start: 2024-06-14

## 2024-06-14 RX ORDER — MODIFIED LANOLIN
1 OINTMENT (GRAM) TOPICAL PRN
COMMUNITY
Start: 2024-06-14

## 2024-06-14 RX ORDER — PSEUDOEPHEDRINE HCL 30 MG
100 TABLET ORAL 2 TIMES DAILY PRN
COMMUNITY
Start: 2024-06-14

## 2024-06-14 RX ORDER — IBUPROFEN 600 MG/1
600 TABLET ORAL EVERY 6 HOURS PRN
Qty: 60 TABLET | Refills: 1 | Status: SHIPPED | OUTPATIENT
Start: 2024-06-14

## 2024-06-14 RX ADMIN — PRENATAL WITH FERROUS FUM AND FOLIC ACID 1 TABLET: 3080; 920; 120; 400; 22; 1.84; 3; 20; 10; 1; 12; 200; 27; 25; 2 TABLET ORAL at 09:04

## 2024-06-14 RX ADMIN — IBUPROFEN 600 MG: 600 TABLET, FILM COATED ORAL at 13:18

## 2024-06-14 RX ADMIN — DOCUSATE SODIUM 100 MG: 100 CAPSULE, LIQUID FILLED ORAL at 09:05

## 2024-06-14 RX ADMIN — ACETAMINOPHEN 1000 MG: 500 TABLET ORAL at 09:04

## 2024-06-14 RX ADMIN — IBUPROFEN 600 MG: 600 TABLET, FILM COATED ORAL at 06:15

## 2024-06-14 ASSESSMENT — PAIN SCALES - GENERAL
PAINLEVEL_OUTOF10: 7
PAINLEVEL_OUTOF10: 1
PAINLEVEL_OUTOF10: 3
PAINLEVEL_OUTOF10: 3
PAINLEVEL_OUTOF10: 1

## 2024-06-14 ASSESSMENT — PAIN DESCRIPTION - DESCRIPTORS
DESCRIPTORS: ACHING;DISCOMFORT;CRAMPING
DESCRIPTORS: CRAMPING;DISCOMFORT
DESCRIPTORS: CRAMPING;DISCOMFORT

## 2024-06-14 ASSESSMENT — PAIN DESCRIPTION - RADICULAR PAIN: RADICULAR_PAIN: ABSENT

## 2024-06-14 ASSESSMENT — PAIN DESCRIPTION - FREQUENCY
FREQUENCY: INTERMITTENT
FREQUENCY: INTERMITTENT

## 2024-06-14 ASSESSMENT — PAIN DESCRIPTION - PAIN TYPE
TYPE: ACUTE PAIN
TYPE: ACUTE PAIN

## 2024-06-14 ASSESSMENT — PAIN DESCRIPTION - ORIENTATION
ORIENTATION: LOWER;MID
ORIENTATION: LOWER;MID
ORIENTATION: LOWER

## 2024-06-14 ASSESSMENT — PAIN DESCRIPTION - ONSET
ONSET: GRADUAL
ONSET: GRADUAL

## 2024-06-14 ASSESSMENT — PAIN DESCRIPTION - LOCATION
LOCATION: ABDOMEN

## 2024-06-14 NOTE — PROGRESS NOTES
Assessment as charted.  Medicated for discomfort.  Pt would like to discharge later.  Instructed to call RN for any needs.

## 2024-06-14 NOTE — PROGRESS NOTES
PPD #1    Patient:  Nirmala Valdez     Admit Date:  6/12/2024 10:01 PM  Medical Record Number:  [id number]   Today's Date: 6/14/2024    S: No complaints, doing well, would like a day #1 discharge; tolerating diet: yes -general; ambulating well: yes -up in room; voiding without difficulty:  yes -has no urinary complaints; bm: yes - normal; flatus: yes -normal; pain meds appropriate: yes -ibuprofen 600 mg and Tylenol; vaginal bleeding: like a heavier period.    O:   Vitals:    06/13/24 1456 06/13/24 1914 06/13/24 2313 06/14/24 0900   BP: 118/64 138/63 113/71 114/62   Pulse: 84 90 79 62   Resp: 18 18 16 16   Temp: 98.2 °F (36.8 °C) 98.5 °F (36.9 °C) 98.1 °F (36.7 °C) 97.7 °F (36.5 °C)   TempSrc: Oral Oral Oral Oral   SpO2: 97% 96% 97%      Gen: A&O, cooperative, pleasant   Heart: RRR   Lungs: CTA b/l   Abd; soft, NT, non distended, +BS  Back: no CVA or paraspinal tenderness   Ext: No clubbing, cyanosis, edema:no , no cords palpable, no calf tenderness   Neuro: intact   Uterus: firm, well contracted, nt   Perineum: intact, healing well   [place] pad: staining only, thin lochia    Lab Results   Component Value Date    HGB 13.3 06/12/2024    HCT 39.4 06/12/2024      Recent Results (from the past 72 hour(s))   Urine Drug Screen    Collection Time: 06/12/24 11:00 PM   Result Value Ref Range    Amphetamine Screen, Ur NEGATIVE NEGATIVE    Barbiturate Screen, Ur NEGATIVE NEGATIVE    Benzodiazepine Screen, Urine NEGATIVE NEGATIVE    Cocaine Metabolite, Urine NEGATIVE NEGATIVE    Methadone Screen, Urine NEGATIVE NEGATIVE    Opiates, Urine NEGATIVE NEGATIVE    Phencyclidine, Urine NEGATIVE NEGATIVE    Cannabinoid Scrn, Ur NEGATIVE NEGATIVE    Oxycodone Screen, Ur NEGATIVE NEGATIVE    Fentanyl, Ur NEGATIVE NEGATIVE    Buprenorphine Urine NEGATIVE NEGATIVE    Test Information       These drug screen results are for medical purposes only and should not be considered definitive or confirmed.   CBC with Auto Differential    Collection

## 2024-06-14 NOTE — PROGRESS NOTES
[facility] [place] [facility] screening results were discussed with parent. Questions answered. Brochure given to parent. Advised to monitor developmental milestones and contact physician for any concerns.   Benjamín Rudd

## 2024-06-14 NOTE — DISCHARGE INSTR - DIET

## 2024-06-14 NOTE — DISCHARGE SUMMARY
Department of Obstetrics and Gynecology  Labor and Delivery  Discharge Summary    Patient:  Nirmala Valdez         Medical Record Number:  [id number]    Admit Date:  2024 10:01 PM    Discharge Date: 2024    Final Diagnosis:   Principal Problem (Resolved):    39 3/7 weeks gestation of pregnancy  Active Problems:    BMI 40.0-44.9, adult (41.62 pregravid BMI)     (normal spontaneous vaginal delivery)    Term birth of  male  Resolved Problems:    Encounter for planned induction of labor - [facility] recommends 39w IOL    Obesity affecting pregnancy in third trimester    Second pregnancy    Multigravida in third trimester 16 week transfer from [facility] at 16w      Chief Complaint - Presenting Illness - Physical Findings:   39 weeks gestation of pregnancy [Z3A.39]  HPI: 29 y.o. W female  history of obesity (pregravid BMI 41.62) at 39w3d admitted at the recommendation of [facility] with a BS = 6 (2 cm) for Cytotec cervical ripening/labor induction.    Hospital Course:   Delivery Summary:   Procedure Date: 2024 1:18 PM      Pre-delivery Diagnosis: Multigravida IUP at 39 weeks for scheduled cervical ripening/labor induction (2 cm, BS = 6) as recommended by [facility], obesity (pregravid BMI 41.62)      Patient Active Problem List   Diagnosis    Obesity affecting pregnancy in third trimester    Second pregnancy    BMI 40.0-44.9, adult (41.62 pregravid BMI)    Multigravida in third trimester 16 week transfer from [facility] at 16w    39 3/7 weeks gestation of pregnancy    Encounter for planned induction of labor - [facility] recommends 39w IOL      Post-delivery Diagnosis:  Living  infant Male      Patient Active Problem List   Diagnosis    Obesity affecting pregnancy in third trimester    Second pregnancy    BMI 40.0-44.9, adult (41.62 pregravid BMI)    Multigravida in third trimester 16 week transfer from [facility] at 16w    39 3/7 weeks gestation of pregnancy    Encounter for planned induction of labor - [facility] recommends 39w IOL     Times       Labor onset date/time:  24 09:13:00      Dilation complete date/time:  24 11:20:00      Start pushing date/time:  2024 11:52:00      Labor Length    1st stage: 2h 07m  2nd stage: 0h 36m  3rd stage: 0h 20m     Delivery Details       Delivery Date: 24 Delivery Time: 11:56:00   Delivery Type: Vaginal, Spontaneous      Presentation    Presentation: Vertex  Position: Right  _: Occiput  _: Anterior     Shoulder Dystocia    Shoulder Dystocia Present?: No     Assisted Delivery Details    Forceps Attempted?: No  Vacuum Extractor Attempted?: No     Cord    Vessels: 3 Vessels  Complications: None  Delayed Cord Clamping?: Yes  Cord Clamped Date/Time: 2024 11:57:00  Cord Blood Disposition: Lab  Gases Sent?: Yes     Placenta    Date/Time: 2024 12:16:00  Removal: Spontaneous  Appearance: Intact  Disposition: Placenta Refrigerator     Lacerations    Episiotomy: None  Perineal Lacerations: None  Other Lacerations: no non-perineal laceration         Vaginal Counts    Initial Count Personnel: DMITRI  Initial Count Verified By: DR GARCIA  Intial Sponge Count: Correct Intial Needles Count: Correct Intial Instruments Count: Correct   Final Sponges Count: Correct Final Needles  Count: Correct Final Instruments Count: Correct   Final Count Personnel: DR. GARCIA  Final Count Verified By: DMITRI  Accurate Final Count?: Yes     Blood Loss  Mother: Nirmala Valdez #[id number]      Start of Mother's Information       Delivery Blood Loss  24 0913 - 24 1333        130 mL          End of Mother's Information  Mother: Maryblossom Nirmala KING #[id number]        Delivery Providers    Delivering clinician: Omar Garcia MD       Provider Role     Omar Garcia MD Obstetrician     Raven Rizo RN Primary Nurse     Soraida Plunkett RN Primary  Nurse     Zia Rothman MD Anesthesiologist     Tera Jo, APRN - CRNA Nurse Anesthetist           [place] Assessment

## 2024-06-14 NOTE — PROGRESS NOTES
Discharge instructions given to pt on self and infant, prescriptions reviewed, and follow up reviewed.  Infant has an appointment for tomorrow morning.  Questions denied.

## 2024-06-14 NOTE — DISCHARGE INSTRUCTIONS
Follow-up with your OB doctor in 1 week if  delivery or in  6 weeks for vaginal delivery unless otherwise instructed.   Call office for an appointment.    For breastfeeding support, you can contact our lactation specialists at [phone] or [phone]    DIET  Eat a well balanced diet focusing on foods high in fiber and protein  Drink plenty of fluids especially water.  To avoid constipation you may take a mild stool softener as recommended by your doctor or midwife.    ACTIVITY  Gradually increase your activity.  Resume exercise regimen only after advised by your doctor or midwife.  Avoid lifting anything heavier than your baby or a gallon of milk for SIX weeks.   Avoid driving until your doctor or midwife has given their approval.  Rise slowly from a lying to sitting and then a standing position.  Climb stairs one at a time.  Use caution when carrying your baby up and down the stairs.  No sexual activity for 6 weeks or until advised by your doctor - Nothing in vagina: intercourse, tampons, or douching.   Be prepared to discuss family planning at your follow-up OB visit.   You may feel tired or have a lack of energy.  You may continue your prenatal vitamin to replenish nutrients post delivery.  Nap when baby naps to catch up on sleep.  May return to work or school in 6 weeks or as directed by OB.     EMOTIONS  You may feed schreiber, sad, teary, & overwhelmed.  Contact your OB provider if you feel you may be showing signs of postpartum depression, or have thoughts of harming yourself or your infant.  If infant will not stop crying, contact another adult for help or place infant in their crib on their back and take a break.  NEVER shake your infant.      BLEEDING  Vaginal bleeding will decrease in amount over the next few weeks.  You will notice that as your activity increases, your flow may increase.  This is your body's way of telling you, you need to take things easier and rest more often.  Call your

## 2024-06-14 NOTE — ANESTHESIA POSTPROCEDURE EVALUATION
Department of Anesthesiology  Postprocedure Note    Patient: Nirmala Valdez  MRN: [id number]  YOB: 1995  Date of evaluation: 6/14/2024    Procedure Summary       Date: 06/13/24 Room / Location:     Anesthesia Start: 0827 Anesthesia Stop: 1156    Procedure: Labor Analgesia Diagnosis:     Scheduled Providers:  Responsible Provider: Zia Rothman MD    Anesthesia Type: general, spinal, epidural ASA Status: 2            Anesthesia Type: No value filed.    Leslie Phase I:      Leslie Phase II:      Anesthesia Post Evaluation    Patient location during evaluation: bedside  Patient participation: complete - patient participated  Level of consciousness: awake and alert  Pain score: 0  Airway patency: patent  Nausea & Vomiting: no nausea and no vomiting  Cardiovascular status: hemodynamically stable  Respiratory status: acceptable  Hydration status: euvolemic  Pain management: adequate and satisfactory to patient        No notable events documented.

## 2024-06-14 NOTE — DISCHARGE INSTR - ACTIVITY
After Your Delivery Discharge Instructions    What to do after you leave the hospital:    Recommended diet: regular diet  Recommended activity: No driving for 1 weeks, no sex or tampon use for 6 weeks. No douching.  No heavy lifting (greater than baby and carrier) for 6 weeks.  Initially, avoid frequent ambulation with stairs - start slowly then increase as tolerated.  You may return to work in 6 weeks.  Showers are fine - avoid bath tubs, hot tubs, swimming.  Follow-up in 6 weeks for final postpartum visit.    Call the Physician with any of these signs and symptoms:    Warning signs regarding stitches:  \"Popping\" of stitches  Foul smelling discharge or pus  More redness or streaks around stitches than before    Perineum / episiotomy care:  Continue care as in the hospital (sitz baths, squirt bottle, etc.)  No tub baths until OK'd by your Physician , showers are fine     After your delivery - signs and symptoms to watch for:  Fever - Oral temperature greater than 100.4 degrees Fahrenheit  Foul-smelling vaginal discharge  Headache unrelieved by \"pain meds\"  Difficulty urinating  Breasts reddened, hard, hot to the touch  Nipple discharge which is foul-smelling or contains pus  Increased pain at the site of the  vaginal area  Difficulty breathing with or without chest pain  New calf pain especially if only on one side  Sudden, continuing increased vaginal bleeding (heavier than a period) with or without clots  Unrelieved feelings of:  Inability to cope  Sadness  Anxiety  Lack of interest in baby  Insomnia  Crying     What to do at home:  Resume activity gradually   Don't lift anything heavier than baby and carrier until OK'd by your Physician   No sex until OK'd by your Physician  Eat regular nutritious meals  Take pain medication as prescribed whenever you need them  To avoid/relieve constipation take stool softeners if advised   Increase fiber in your diet    Most importantly ENJOY your Baby!  - Dr. GOODMAN =)))    Please  call immediately with Questions and Concerns - [phone] (Office) or [phone] (Answering Service) after hours and weekends.     By signing below, I understand that if any emergent problems occur, once I leave the hospital, I am to dial #[phone] or go immediately to the nearest Emergency Room.  For less emergent matters,  Dr. Garcia can be reached by calling his Office or  answering service at the above numbers.  I understand and acknowledge receipt of the instructions indicated above.